# Patient Record
Sex: FEMALE | Race: BLACK OR AFRICAN AMERICAN | NOT HISPANIC OR LATINO | Employment: OTHER | URBAN - METROPOLITAN AREA
[De-identification: names, ages, dates, MRNs, and addresses within clinical notes are randomized per-mention and may not be internally consistent; named-entity substitution may affect disease eponyms.]

---

## 2021-06-03 ENCOUNTER — RECORDS - HEALTHEAST (OUTPATIENT)
Dept: ADMINISTRATIVE | Facility: CLINIC | Age: 31
End: 2021-06-03

## 2022-02-01 ENCOUNTER — TELEPHONE (OUTPATIENT)
Dept: FAMILY MEDICINE | Facility: CLINIC | Age: 32
End: 2022-02-01
Payer: MEDICARE

## 2022-02-03 ENCOUNTER — TELEPHONE (OUTPATIENT)
Dept: FAMILY MEDICINE | Facility: CLINIC | Age: 32
End: 2022-02-03

## 2022-02-03 NOTE — TELEPHONE ENCOUNTER
02/3/2022: Care Coordination    Ms. Riojas called reporting that her child was sent to [facility] from school after having a asthma attack. Ms. Riojas requested that I cancel the ride scheduled to our office and schedule one to the hospital to care for her child.    I called [employer] [phone] to reschedule. The cab was ready to pick her up within 3 minutes. She will need to use the will call service for their return trip.      Daniel Coyne  Care Coordination   [address] [address]  [email or web address]   Office: [phone] Direct:[phone]  [facility]

## 2022-02-09 NOTE — TELEPHONE ENCOUNTER
2/9/22:   Patient called requesting ride to apt tomorrow at 1:10pm. Apt was for last Thurs 2/3, rescheduled for tomorrow Thurs 2/9 at 1:10pm. Has Medicaid. Called [employer] ([phone])    Ride will be with [employer] ([phone]), pickup time 12:10-12:40pm.     Called patient to let her know. Clarified that she is using electric wheelchair, and needs a van. Called [employer] to query about this, they are not able to provide a handicapped accessible van due to lack of contract with accessible transport companies. They expect clinics to arrange transportation.   Called [employer] and arranged van pickup.     Ride will be with [employer] ([phone]) , pickup time 12:10-12:40pm.     ADDENDUM 2/10/2022 1:42 PM:  Patient had not been picked up by [employer] at 1:00pm, and apt was at 1:10pm. She had called [employer] many times and they kept saying all the handicapped accessible vans were in use, and that she should be patient and wait for a ride. Expressed frustration regarding this.     Would like to reschedule and can try to get regular cab ride, and have her PCA assist her in getting from her motor wheelchair into the car. Can use manual wheelchairs from clinic once she arrives.     Rescheduled for Thurs 2/17/22 at 2:50pm with Dr. Yates. Set up ride through [facility]. Ride will be with [employer] ([phone]) with pickup around 1:50pm.     Called patient back to inform.     NALDO Huitron

## 2022-02-17 ENCOUNTER — OFFICE VISIT (OUTPATIENT)
Dept: FAMILY MEDICINE | Facility: CLINIC | Age: 32
End: 2022-02-17
Payer: MEDICARE

## 2022-02-17 VITALS
HEART RATE: 75 BPM | TEMPERATURE: 98.8 F | OXYGEN SATURATION: 100 % | DIASTOLIC BLOOD PRESSURE: 94 MMHG | RESPIRATION RATE: 16 BRPM | SYSTOLIC BLOOD PRESSURE: 154 MMHG

## 2022-02-17 DIAGNOSIS — I10 ESSENTIAL HYPERTENSION: ICD-10-CM

## 2022-02-17 DIAGNOSIS — Z12.4 CERVICAL CANCER SCREENING: ICD-10-CM

## 2022-02-17 DIAGNOSIS — Z11.4 SCREENING FOR HIV (HUMAN IMMUNODEFICIENCY VIRUS): ICD-10-CM

## 2022-02-17 DIAGNOSIS — Z11.59 NEED FOR HEPATITIS C SCREENING TEST: ICD-10-CM

## 2022-02-17 DIAGNOSIS — R87.613 HSIL (HIGH GRADE SQUAMOUS INTRAEPITHELIAL LESION) ON PAP SMEAR OF CERVIX: ICD-10-CM

## 2022-02-17 DIAGNOSIS — N31.9 NEUROGENIC BLADDER: ICD-10-CM

## 2022-02-17 DIAGNOSIS — J30.1 ALLERGIC RHINITIS DUE TO POLLEN, UNSPECIFIED SEASONALITY: ICD-10-CM

## 2022-02-17 DIAGNOSIS — J45.20 MILD INTERMITTENT ASTHMA WITHOUT COMPLICATION: Primary | ICD-10-CM

## 2022-02-17 DIAGNOSIS — F33.9 DEPRESSION, RECURRENT (H): ICD-10-CM

## 2022-02-17 DIAGNOSIS — G82.20 LOWER PARAPLEGIA (H): ICD-10-CM

## 2022-02-17 DIAGNOSIS — D50.9 MICROCYTIC ANEMIA: ICD-10-CM

## 2022-02-17 LAB
ERYTHROCYTE [DISTWIDTH] IN BLOOD BY AUTOMATED COUNT: 14.3 % (ref 10–15)
FERRITIN SERPL-MCNC: 53 NG/ML (ref 10–130)
HCT VFR BLD AUTO: 32.5 % (ref 35–47)
HGB BLD-MCNC: 10.6 G/DL (ref 11.7–15.7)
HIV 1+2 AB+HIV1 P24 AG SERPL QL IA: NEGATIVE
MCH RBC QN AUTO: 29.8 PG (ref 26.5–33)
MCHC RBC AUTO-ENTMCNC: 32.6 G/DL (ref 31.5–36.5)
MCV RBC AUTO: 91 FL (ref 78–100)
PLATELET # BLD AUTO: 292 10E3/UL (ref 150–450)
RBC # BLD AUTO: 3.56 10E6/UL (ref 3.8–5.2)
WBC # BLD AUTO: 6 10E3/UL (ref 4–11)

## 2022-02-17 PROCEDURE — 82728 ASSAY OF FERRITIN: CPT | Performed by: FAMILY MEDICINE

## 2022-02-17 PROCEDURE — 86803 HEPATITIS C AB TEST: CPT | Performed by: FAMILY MEDICINE

## 2022-02-17 PROCEDURE — 87389 HIV-1 AG W/HIV-1&-2 AB AG IA: CPT | Performed by: FAMILY MEDICINE

## 2022-02-17 PROCEDURE — 99204 OFFICE O/P NEW MOD 45 MIN: CPT | Performed by: FAMILY MEDICINE

## 2022-02-17 PROCEDURE — 36415 COLL VENOUS BLD VENIPUNCTURE: CPT | Performed by: FAMILY MEDICINE

## 2022-02-17 PROCEDURE — 85027 COMPLETE CBC AUTOMATED: CPT | Performed by: FAMILY MEDICINE

## 2022-02-18 LAB — HCV AB SERPL QL IA: NONREACTIVE

## 2022-02-19 PROBLEM — G60.8 PERIPHERAL SENSORY NEUROPATHY: Status: ACTIVE | Noted: 2021-03-30

## 2022-02-19 PROBLEM — F33.9 DEPRESSION, RECURRENT (H): Status: ACTIVE | Noted: 2021-03-30

## 2022-02-19 PROBLEM — O14.90 PREECLAMPSIA: Status: ACTIVE | Noted: 2021-05-18

## 2022-02-19 PROBLEM — F12.90 MARIJUANA USE: Status: ACTIVE | Noted: 2021-03-30

## 2022-02-19 PROBLEM — Z87.440 HISTORY OF RECURRENT UTIS: Status: ACTIVE | Noted: 2021-03-30

## 2022-02-19 PROBLEM — R87.613 HSIL (HIGH GRADE SQUAMOUS INTRAEPITHELIAL LESION) ON PAP SMEAR OF CERVIX: Status: ACTIVE | Noted: 2021-03-30

## 2022-02-19 RX ORDER — DULOXETIN HYDROCHLORIDE 60 MG/1
60 CAPSULE, DELAYED RELEASE ORAL DAILY
COMMUNITY
Start: 2021-10-25 | End: 2022-02-19

## 2022-02-19 RX ORDER — LORATADINE 10 MG/1
10 TABLET ORAL DAILY PRN
Qty: 30 TABLET | Refills: 4 | Status: SHIPPED | OUTPATIENT
Start: 2022-02-19 | End: 2023-12-04

## 2022-02-19 RX ORDER — DULOXETIN HYDROCHLORIDE 60 MG/1
60 CAPSULE, DELAYED RELEASE ORAL DAILY
Qty: 30 CAPSULE | Refills: 11 | Status: SHIPPED | OUTPATIENT
Start: 2022-02-19 | End: 2023-12-06

## 2022-02-19 RX ORDER — PANTOPRAZOLE SODIUM 20 MG/1
20 TABLET, DELAYED RELEASE ORAL DAILY
COMMUNITY
Start: 2021-10-25 | End: 2023-12-06

## 2022-02-19 RX ORDER — CYCLOBENZAPRINE HCL 10 MG
10 TABLET ORAL 3 TIMES DAILY PRN
COMMUNITY
Start: 2021-10-25 | End: 2022-03-16

## 2022-02-19 RX ORDER — AMLODIPINE BESYLATE 5 MG/1
5 TABLET ORAL DAILY
COMMUNITY
Start: 2021-10-25 | End: 2022-02-19

## 2022-02-19 RX ORDER — AMLODIPINE BESYLATE 5 MG/1
5 TABLET ORAL DAILY
Qty: 90 TABLET | Refills: 3 | Status: SHIPPED | OUTPATIENT
Start: 2022-02-19 | End: 2023-12-06
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
## 2022-02-19 NOTE — PATIENT INSTRUCTIONS
The main three health concerns we discussed and plan to address today are your Blood Pressure, your mood and your history of abnormal PAP of your cervix.  1) Amlodipine prescriptions sent to your pharmacy.  and start. Use a pill box to help remember to take daily. You will take this medication long term to prevent things like strokes and heart attacks in 10-20 years.  2) Duloxetine prescription sent as well. This is to help mood. Take daily. OK to take same time as amlodipine. Recheck in one month. If duloxetine helpful, will continue for one year. Will consider tapering off the medication each year, depending on what's going on in your life.  3) Return for repeat PAP when not on period. (4weeks)

## 2022-03-16 ENCOUNTER — MEDICAL CORRESPONDENCE (OUTPATIENT)
Dept: HEALTH INFORMATION MANAGEMENT | Facility: CLINIC | Age: 32
End: 2022-03-16

## 2022-03-16 ENCOUNTER — OFFICE VISIT (OUTPATIENT)
Dept: FAMILY MEDICINE | Facility: CLINIC | Age: 32
End: 2022-03-16
Payer: MEDICARE

## 2022-03-16 VITALS
SYSTOLIC BLOOD PRESSURE: 143 MMHG | OXYGEN SATURATION: 100 % | DIASTOLIC BLOOD PRESSURE: 84 MMHG | TEMPERATURE: 98.1 F | HEART RATE: 71 BPM | RESPIRATION RATE: 16 BRPM

## 2022-03-16 DIAGNOSIS — B96.89 BACTERIAL VAGINOSIS: ICD-10-CM

## 2022-03-16 DIAGNOSIS — N76.0 BACTERIAL VAGINOSIS: ICD-10-CM

## 2022-03-16 DIAGNOSIS — G82.20 LOWER PARAPLEGIA (H): ICD-10-CM

## 2022-03-16 DIAGNOSIS — Z12.4 CERVICAL CANCER SCREENING: ICD-10-CM

## 2022-03-16 DIAGNOSIS — R87.613 HSIL (HIGH GRADE SQUAMOUS INTRAEPITHELIAL LESION) ON PAP SMEAR OF CERVIX: ICD-10-CM

## 2022-03-16 DIAGNOSIS — N89.8 VAGINAL DISCHARGE: ICD-10-CM

## 2022-03-16 DIAGNOSIS — N30.00 ACUTE CYSTITIS WITHOUT HEMATURIA: ICD-10-CM

## 2022-03-16 DIAGNOSIS — L89.159 PRESSURE INJURY OF SKIN OF SACRAL REGION, UNSPECIFIED INJURY STAGE: ICD-10-CM

## 2022-03-16 DIAGNOSIS — Z11.51 SCREENING FOR HUMAN PAPILLOMAVIRUS: Primary | ICD-10-CM

## 2022-03-16 DIAGNOSIS — R32 URINARY INCONTINENCE, UNSPECIFIED TYPE: ICD-10-CM

## 2022-03-16 DIAGNOSIS — I10 ESSENTIAL HYPERTENSION: ICD-10-CM

## 2022-03-16 LAB
CLUE CELLS: PRESENT
TRICHOMONAS, WET PREP: ABNORMAL
WBC'S/HIGH POWER FIELD, WET PREP: ABNORMAL
YEAST, WET PREP: ABNORMAL

## 2022-03-16 PROCEDURE — 99215 OFFICE O/P EST HI 40 MIN: CPT | Mod: GC | Performed by: STUDENT IN AN ORGANIZED HEALTH CARE EDUCATION/TRAINING PROGRAM

## 2022-03-16 PROCEDURE — 87591 N.GONORRHOEAE DNA AMP PROB: CPT | Performed by: STUDENT IN AN ORGANIZED HEALTH CARE EDUCATION/TRAINING PROGRAM

## 2022-03-16 PROCEDURE — 87624 HPV HI-RISK TYP POOLED RSLT: CPT | Performed by: STUDENT IN AN ORGANIZED HEALTH CARE EDUCATION/TRAINING PROGRAM

## 2022-03-16 PROCEDURE — G0123 SCREEN CERV/VAG THIN LAYER: HCPCS | Performed by: STUDENT IN AN ORGANIZED HEALTH CARE EDUCATION/TRAINING PROGRAM

## 2022-03-16 PROCEDURE — 87210 SMEAR WET MOUNT SALINE/INK: CPT | Performed by: STUDENT IN AN ORGANIZED HEALTH CARE EDUCATION/TRAINING PROGRAM

## 2022-03-16 RX ORDER — NITROFURANTOIN 25; 75 MG/1; MG/1
100 CAPSULE ORAL 2 TIMES DAILY
Qty: 10 CAPSULE | Refills: 0 | Status: SHIPPED | OUTPATIENT
Start: 2022-03-16 | End: 2022-03-21

## 2022-03-16 RX ORDER — AMLODIPINE BESYLATE 5 MG/1
5 TABLET ORAL DAILY
Qty: 90 TABLET | Refills: 0 | Status: SHIPPED | OUTPATIENT
Start: 2022-03-16 | End: 2022-06-21

## 2022-03-16 RX ORDER — METRONIDAZOLE 500 MG/1
500 TABLET ORAL 2 TIMES DAILY
Qty: 14 TABLET | Refills: 0 | Status: SHIPPED | OUTPATIENT
Start: 2022-03-16 | End: 2022-03-23

## 2022-03-16 RX ORDER — CYCLOBENZAPRINE HCL 10 MG
10 TABLET ORAL 3 TIMES DAILY PRN
Qty: 120 TABLET | Refills: 3 | Status: SHIPPED | OUTPATIENT
Start: 2022-03-16 | End: 2023-12-04

## 2022-03-16 NOTE — Clinical Note
Please review orders and note as there are a few things interfering with note closure.  Best wishes,  John Tee MD

## 2022-03-16 NOTE — PROGRESS NOTES
Preceptor Attestation:    I discussed the patient with the resident and evaluated the patient in person. I have verified the content of the note, which accurately reflects my assessment of the patient and the plan of care.   Supervising Physician:  John Tee MD.

## 2022-03-16 NOTE — PROGRESS NOTES
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 seating system to rise up 7 , this will allow her greater success with lateral transfers, completion of self care tasks, provide care for her children, and participation in work related requirements. Carol can adjust the height of the power wheelchair seat to help create a  downhill  transfer toward her desired surface (bed, wheelchair, commode). This power feature will allow her increased safety and independence with completing lateral transfers. She is home alone for 9 hours per day, it is important that she be as independent as possible with all her self care and positioning needs. Due to her spinal cord injury, her lower extremities are paralyzed. This power seat elevator will allow Carol to reach items in her bathroom to complete grooming task, in the kitchen for simple meal prep, to assist her children with their cares and homework. At work, Carol would be able to access counters, cupboards and smart boards to lead meetings, meet with customers and colleagues, help direct customers to where they need to go, and help safely navigate the crowded hallways during evens. She can raise up the height of the wheelchair to engage in age  appropriate eye to eye discussions with medical professionals, teachers, and coworkers. She can use the seat elevator to raise up higher than the work surface, so she can look down on the project and provide supervision and direction to children, to assist with completion of the project. This power feature will allow Carol to be more independent with her own self cares and simple meal prep, and allow her to complete her necessary job duties, assist her children with their school projects. This power wheelchair feature is essential for Carol s independence with transfers, self cares, psychosocial needs, and functional mobility. The power seat elevator feature is a necessary component to allow the power standing feature to engage.  5. StandUp Power Stand Module is important for several reasons. This standing feature will allow for true weight shifting and pressure relief, as Carol can put her weight through her legs/feet. Carol stated she feels much better when in the standing position - the pain eases in her legs, she feels like she can breathe easier. During the trial of this standing power wheelchair, Carol demonstrated safe use of the wheelchair in standing and driving mode. She was able to safely navigate within the therapy area, going up/down the ramp/incline also the sidewalks and curb cut outs similar to a community setting. This power standing wheelchair allows for independent weight bearing which is essential to reducing osteoporosis, and will help to reduce the risk of contractures. The standing feature transfers pressure away from the bony parts of her back and buttocks, to help reduce the risk of skin breakdown. After completing the trial with the standing power wheelchair, Carol stated she could stand up as needed to alleviate pain, regardless of where she is. The standing feature helps with digestion, respiration and bowel and bladder management. A study done in 1948 on the effects of  immobility on otherwise healthy individuals had some remarkable findings. After only 6 weeks of bedrest, the participants had a significant decrease in their bone density, increased risk of pressure ulcers, increased development of joint contractures, impaired bowel and bladder functioning, impaired respiratory functioning, and gastro-intestinal problems. Carol has stated that she feels she can breathe better and has decreased spasticity when she is standing. When going from sitting to standing position, she can stop as needed to help reduce the risk of orthostatic hypotension, and also provide spasticity management. During the standing wheelchair trial, she said that the knee blocks helped to manage her spasticity, and that she felt comfortable with the positioning in the standing wheelchair. Studies have shown the positive effects of standing on the bone density of the spinal cord population. This is especially important to help protect the integrity of her bones, help prevent osteoporosis, and decrease the risk of fractures. The standing position of the wheelchair will allow Carol to be eye to eye with her peers, coworkers and friends/family; something that she has not been able to attain since the day of her spinal cord injury in 2011. This will allow her to interact with her peers on an equal basis. Most importantly, since Carol is home alone for 9 hours per day, the standing feature improves access to sinks (for hygiene and self cares), counters (to access self care items), cabinets and refrigerator (for meal prep items), microwave (to heat up/make meals) and closets (for clothing items) when standing and driving. These are all essential for Carol s independence. In the community, she will be able to buy movie tickets from the high counters, or retrieve paper towels from a dispenser that is too high from a seated wheelchair position. Carol would be able to interact appropriately with professionals,  whether it is store employees or her medical practitioners. 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 Carol would be able to stand and look down on the work projects and provide supervision and direction to children to assist with completion of their homework tasks. Overall, the standing feature of this power wheelchair is essential to her physical, emotional, and psychosocial.  6. Matrx Ernesto head rest is necessary with any tilt/recline feature, as it is essential that Carol has a place to rest her head when tilted/reclined back. Tilting or reclining without head support can cause chronic neck pain and injury. It is also a safety feature that is recommended when Carol is using medical or public transportation. It will help prevent head and neck injuries in case the vehicle in which she is riding is rear ended.  7. Adjustable removable hardware is recommended for use with a headrest. The adjustable removable hardware is essential for optimal positioning of the headrest, and it can be easily removed as needed.  8. Matrx Elite back rest is recommended to properly support Carol s back. Carol has weakened trunk muscles due to spinal cord injury. She needs a back rest that will provide the support her trunk needs, for positioning and pain relief. This style of back rest will help her maintain trunk midline, thus allowing for a more functional upper extremity reach. During the trial, Carol found this back rest to be comfortable.  9. Demar Fusion cushion is necessary as Carol is highly susceptible to decubiti. Carol has weakened pelvic muscles and decreased sensation in her buttocks. She has a history of buttock skin breakdown, that healed skin will be fragile for the rest of her life. Carol is at high risk for skin breakdown. This cushion will provide proper pelvic  positioning and help protect skin integrity. This is a replacement of Carol hoover current cushion, as she has not had any skin issues on this cushion in the past. Carol found this cushion to be comfortable during the home trial.  10. Thigh guides, 2 pair on each side, are important to provide good positioning for Carol hoover legs. Because of her spinal cord injury and resulting paraplegia, Carol has no leg function or strength. We are requesting 2 sets of thigh guides, one that will be placed up by her hip and one down closer to her knee. This set of thigh guides will help guide and keep her legs in midline positioning, which is especially important when she going into standing position. Without these thigh guides, Carol hoover legs splay outward and cause her lower legs to rest on the leg rests and brackets. This extra pressure on the lateral edges of her lower legs increases her risk for skin breakdown in that area and causes extra pressure on her hips, increasing his low back pain. These thigh guides are installed on the seat rail to provide optimal support to her legs. These thigh guides are essential to good leg positioning and better distribution of her seated pressure. The correct positioning of her legs is essential when Carol uses the power standing feature. Incorrectly positioned legs could result in severe injury to her legs and instability when in the standing feature of the power wheelchair.  11. Adjustable removable hardware for thigh guides are recommended for Carol hoover power wheelchair. This hardware allows the thigh guides to be installed on the seat rail, so they can be optimally positioned. The thigh guides can be adjusted above the cushion and in close to Carol hoover legs to provide necessary support. The hardware also ensures that the guides can be easily removed for lateral transfers.  12. Flat Multi position arm pads are necessary for Carol hoover power wheelchair. These arm pads will allow for them  to be installed on the arm rest tubes as needed to support her arms.  13.  Outback  option for arm rest installation is requested to allow the above requested arm pad to be installed inside the arm rest tube, ensuring that the arm pad can be installed as close as possible to Carol s trunk while still allowing the arm rest to be flipped back and out of the way for lateral transfers. This is an important safety feature to ensure she can complete safe and independent lateral transfers.  14. Power elevating articulating leg rests are recommended to properly support Crystal s feet and lower extremities. It is important that her feet are properly supported, as that will provide a good positioning base for her lower extremities. Good leg positioning will evenly distribute her seated pressure. The power feature of these leg rests allow Carol to be independent with her positioning needs; this is essential because she is home alone for 9 hours per day. When using the power elevating legs in conjunction with power tilt, it will help to decrease the edema in her legs and feet. This center mount power foot rest system allows for a smaller overall foot print of the wheelchair, helping ensure that Carol can access all important areas of her house and work environments. Additionally, this power elevating leg rest system is required for the power standing feature.  15. 2 piece footplate, 9.5  deep x 5.5  wide, is recommended for Carol s power wheelchair. These foot plates will accommodate her feet and allow for optimal leg positioning. This is especially important for when she is using the standing feature on her power wheelchair, her feet need to be optimally placed and positioned to ensure good weight bearing and reduce the risk of injury.  16. Motion height adjustable swing away quad-link joystick mount is recommended as it allows Carol to get the joystick out of the way for transfers and get closer to tables for  functional tasks such as brushing teeth, eating, and kitchen activities. This hardware mount allows for both vertical height and horizontal distance adjustment. Standard joystick mounting hardware only allows for horizontal adjustment (forward or back pearce). This mounting hardware allows for vertical height adjustment of the joystick knob, so it can be placed where it is comfortable for Carol hoover reach, wrist placement, etc. This joystick hardware will allow for the optimal placement of the joystick, for safe and independent operation of the power wheelchair.  17. 8-Way Rocker switch is requested for Carol hoover power wheelchair. This switch box provides up/down button functions for each of the power positioning features (tilt, recline, elevate, stand), installed separately from the joystick. This allows Carol the ability to quickly and efficiently access whichever power function she needs without having to toggle through the joystick and then have to toggle back to the drive function. It allows for greater safety and independence with operating and controlling all the power functions on her power wheelchair.  18. Expandable Controller is necessary to operate the wheelchair s power features. It will interface between the wheelchair electronics, the drive controls and membrane switch box.  19. Harness for expandable electronics is required for the operation of this power wheelchair. The harness includes the wires, fuse boxes, fuses, circuits, switches and all necessary fasteners, connectors, and mounting hardware required for the operation of the expandable controller.  20. Multiple Function through drive control is required due to the number of power features that are recommended for this wheelchair. This kit will allow all the different power functions to work independent of and in conjunction with each other. This item is vital to ensuring Carol hoover independence with his/her positioning and mobility needs.  21.  Wheelchair transport tie downs are important for this power wheelchair. They provide an essential safety feature as Carol uses medical or public transportation for all her appointments. Her wheelchair needs to be tied down whenever she is being transported.  22. Group 24 batteries are recommended so Carol can safely operate all the power systems and won t run out of power during the day. 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 She enjoys spending time with her 4 children, ages 16 to . She is in her power wheelchair for 11 hours per day. She is home alone for 9 hours per day. Carol needs to know that her power wheelchair will be reliable and have the power needed to last for her long active days. Without batteries, the wheelchair is non operational.    All other wheelchair features are standard or no-charge options to this chair.    If you have further questions regarding this request, you may call [phone]. Thank you for your prompt consideration of this wheelchair request to meet Carol s needs.    Physician or Non-Physician Provider signature indicates agreement with above plan.    Leonor Powell NP Date Time  Nurse Practitioner    Marianna Chaney MA, OTR/L, ATP Date Time  Rehabilitation Equipment Services

## 2022-03-17 LAB — N GONORRHOEA DNA SPEC QL NAA+PROBE: NEGATIVE

## 2022-03-18 LAB
HUMAN PAPILLOMA VIRUS 16 DNA: NEGATIVE
HUMAN PAPILLOMA VIRUS 18 DNA: NEGATIVE
HUMAN PAPILLOMA VIRUS FINAL DIAGNOSIS: NORMAL
HUMAN PAPILLOMA VIRUS OTHER HR: NEGATIVE

## 2022-03-22 LAB
BKR LAB AP GYN ADEQUACY: NORMAL
BKR LAB AP GYN INTERPRETATION: NORMAL
BKR LAB AP GYN OTHER FINDINGS: NORMAL
BKR LAB AP HPV REFLEX: NORMAL
BKR LAB AP PREVIOUS ABNL DX: NORMAL
BKR LAB AP PREVIOUS ABNORMAL: NORMAL
PATH REPORT.COMMENTS IMP SPEC: NORMAL
PATH REPORT.COMMENTS IMP SPEC: NORMAL
PATH REPORT.RELEVANT HX SPEC: NORMAL

## 2022-03-22 NOTE — PATIENT INSTRUCTIONS
22   UROLOGY ADULT REFERRAL   [facility]  [phone] or [phone] (Physician hotline: [phone])  Fax: [phone]  Fax: [phone] (try this one if above fax fails)    Referral, demographics, office notes and labs faxed to [phone].     Mabel Bridges

## 2022-03-24 PROBLEM — R87.619 ABNORMAL PAP SMEAR OF CERVIX: Status: ACTIVE | Noted: 2022-03-16

## 2022-03-27 ENCOUNTER — HEALTH MAINTENANCE LETTER (OUTPATIENT)
Age: 32
End: 2022-03-27

## 2022-03-30 ENCOUNTER — MEDICAL CORRESPONDENCE (OUTPATIENT)
Dept: HEALTH INFORMATION MANAGEMENT | Facility: CLINIC | Age: 32
End: 2022-03-30

## 2022-04-05 ENCOUNTER — VIRTUAL VISIT (OUTPATIENT)
Dept: FAMILY MEDICINE | Facility: CLINIC | Age: 32
End: 2022-04-05
Payer: MEDICARE

## 2022-04-05 DIAGNOSIS — L89.159 PRESSURE INJURY OF SKIN OF SACRAL REGION, UNSPECIFIED INJURY STAGE: ICD-10-CM

## 2022-04-05 DIAGNOSIS — J30.1 ALLERGIC RHINITIS DUE TO POLLEN, UNSPECIFIED SEASONALITY: ICD-10-CM

## 2022-04-05 DIAGNOSIS — R32 URINARY INCONTINENCE, UNSPECIFIED TYPE: ICD-10-CM

## 2022-04-05 DIAGNOSIS — N31.9 NEUROGENIC BLADDER: ICD-10-CM

## 2022-04-05 DIAGNOSIS — I10 ESSENTIAL HYPERTENSION: Primary | ICD-10-CM

## 2022-04-05 DIAGNOSIS — N30.00 ACUTE CYSTITIS WITHOUT HEMATURIA: ICD-10-CM

## 2022-04-05 DIAGNOSIS — G82.20 LOWER PARAPLEGIA (H): ICD-10-CM

## 2022-04-05 PROCEDURE — 99443 PR PHYSICIAN TELEPHONE EVALUATION 21-30 MIN: CPT | Mod: GC | Performed by: STUDENT IN AN ORGANIZED HEALTH CARE EDUCATION/TRAINING PROGRAM

## 2022-04-05 RX ORDER — CETIRIZINE HYDROCHLORIDE 10 MG/1
10 TABLET ORAL DAILY
Qty: 90 TABLET | Refills: 3 | Status: SHIPPED | OUTPATIENT
Start: 2022-04-05 | End: 2023-12-06

## 2022-04-05 RX ORDER — FLUTICASONE PROPIONATE 50 MCG
2 SPRAY, SUSPENSION (ML) NASAL 2 TIMES DAILY PRN
Qty: 16 G | Refills: 3 | Status: SHIPPED | OUTPATIENT
Start: 2022-04-05 | End: 2023-12-04

## 2022-04-05 ASSESSMENT — ANXIETY QUESTIONNAIRES
1. FEELING NERVOUS, ANXIOUS, OR ON EDGE: NOT AT ALL
5. BEING SO RESTLESS THAT IT IS HARD TO SIT STILL: NOT AT ALL
2. NOT BEING ABLE TO STOP OR CONTROL WORRYING: NOT AT ALL
IF YOU CHECKED OFF ANY PROBLEMS ON THIS QUESTIONNAIRE, HOW DIFFICULT HAVE THESE PROBLEMS MADE IT FOR YOU TO DO YOUR WORK, TAKE CARE OF THINGS AT HOME, OR GET ALONG WITH OTHER PEOPLE: NOT DIFFICULT AT ALL
6. BECOMING EASILY ANNOYED OR IRRITABLE: NOT AT ALL
3. WORRYING TOO MUCH ABOUT DIFFERENT THINGS: NOT AT ALL
7. FEELING AFRAID AS IF SOMETHING AWFUL MIGHT HAPPEN: NOT AT ALL
GAD7 TOTAL SCORE: 0

## 2022-04-05 ASSESSMENT — PATIENT HEALTH QUESTIONNAIRE - PHQ9
SUM OF ALL RESPONSES TO PHQ QUESTIONS 1-9: 0
5. POOR APPETITE OR OVEREATING: NOT AT ALL

## 2022-04-05 ASSESSMENT — ASTHMA QUESTIONNAIRES: ACT_TOTALSCORE: 24

## 2022-04-05 NOTE — PROGRESS NOTES
Preceptor Attestation:   I discussed the patient with the resident. I talked to the patient on the phone. I have verified the content of the note, which accurately reflects my assessment of the patient and the plan of care.   Supervising Physician:  John Tee MD.

## 2022-04-05 NOTE — PROGRESS NOTES
Carol is a 31 year old who is being evaluated via a billable telephone visit.      What phone number would you like to be contacted at? [phone]  How would you like to obtain your AVS? Mail a copy    Assessment & Plan       Allergic rhinitis due to pollen, unspecified seasonality  - Cetirizine and flonase    Neurogenic bladder  Acute cystitis without hematuria  Symptoms improved but now might be getting worse.  She needs supplies due to neurogenic bladder, paraplegia, recurrent UTIs, risk of systemic infection.  Carol will call [facility].  Route to RN to clarify supply needs so that I can reorder or sign necessary forms for [employer].  Needs female urinal.  - Female urinal    Lower paraplegia (H)  Has an upcoming appt 4/13.  Needs help with ride.  Route to SW.     Pressure injury of skin of sacral region, unspecified injury stage  Has not been able to get a visit scheduled.  Will route to SW and RN to assist.     Urinary incontinence, unspecified type  Needs supplies.  [facility] reportedly faxed a form to PCP, but I have sent anything I've received back.  I wonder if orders were sent to previous PCP.  Will route to RN to clarify orders with Carol and we might need to reorder via [employer] smart set.  Carol will send a [employer] message with questions and once she learns more from [facility].     Essential hypertension  Unable to get cuff.  Will ask [facility].    - Take amlodipine at night      Needs:  - catheter supplies  - female urinal  - blood pressure cuff (has paper rx)  - wound care  - ride 4/13       Staffed with Dr. Tee.      Rani Mac MD  [facility]    Subjective     Chief Complaint   Patient presents with     other     Having bladder infection. Still hasnt received her replies     other     taking BP medication makes her feel fatigue          HPI     Feels drained and lightheaded:  Takes amlodipine in AM after eating breakfast.  Didn't know if it wsa due to antibiotics.  Doing well today.   Wondering if it was blood pressure medicine or due to antibiotics.      Cystitis:  Tried to get more medical supplies from [facility]. They said they were waiting to get forms from provider which I have filled out and had faxed back.      Prescription for BP cuff:  Didn't have in stock.  The other is close to [place].  Has not been taking blood pressure because she hasn't had cuff    Wheelchair problems:  [facility].  Needs help setting up ride for this.  Has been having issues with other ride service.      Physical therapy:  Would like to start getting physical therapy.     Urology:  They called patient.  They are working on finding a time to get pt on schedule in April.     Wound care:  Has not heard back.  Tried calling a couple of times.     Asthma  ACT Total Scores 4/5/2022   ACT TOTAL SCORE (Goal Greater than or Equal to 20) 24   In the past 12 months, how many times did you visit the emergency room for your asthma without being admitted to the hospital? 0   In the past 12 months, how many times were you hospitalized overnight because of your asthma? 0     BILLIE  BILLIE-7 SCORE 4/5/2022   Total Score 0     Depression Screening  PHQ 4/5/2022   PHQ-9 Total Score 0   Q9: Thoughts of better off dead/self-harm past 2 weeks Not at all             Review of Systems   Constitutional, HEENT, cardiovascular, pulmonary, gi and gu systems are negative, except as otherwise noted.      Objective           Vitals:  No vitals were obtained today due to virtual visit.    Physical Exam   healthy, alert and no distress  PSYCH: Alert and oriented times 3; coherent speech, normal   rate and volume, able to articulate logical thoughts, able   to abstract reason, no tangential thoughts, no hallucinations   or delusions  Her affect is normal and pleasant  RESP: No cough, no audible wheezing, able to talk in full sentences  Remainder of exam unable to be completed due to telephone visits    Office Visit on 03/16/2022    Component Date Value Ref Range Status     Trichomonas 03/16/2022 Absent  Absent Final     Yeast 03/16/2022 Absent  Absent Final     Clue Cells 03/16/2022 Present (A) Absent Final     WBCs/high power field 03/16/2022 2+ (A) None Final     Interpretation 03/16/2022 Negative for Intraepithelial Lesion or Malignancy (NILM)    Final     Other Findings 03/16/2022 Shift in vaginal alen suggestive of bacterial vaginosis  Bacteria morphologically consistent with Actinomyces spp  Endometrial cells in a woman >=45 years of age; endometrial cells correlate with menstrual history provided, Trichomonas vaginalis, Fungal organisms morphologically consistent with Candida spp, Shift in vaginal alen suggestive of bacterial vaginosis,... Final     Comment 03/16/2022    Final                    Value:This result contains rich text formatting which cannot be displayed here.     Specimen Adequacy 03/16/2022 Satisfactory for evaluation, endocervical/transformation zone component present   Final     Clinical Information 03/16/2022    Final                    Value:This result contains rich text formatting which cannot be displayed here.     Reflex Testing 03/16/2022 Yes regardless of result   Final     Previous Abnormal? 03/16/2022    Final                    Value:This result contains rich text formatting which cannot be displayed here.     Previous Abnormal Diagnosis 03/16/2022    Final                    Value:This result contains rich text formatting which cannot be displayed here.     Performing Labs 03/16/2022    Final                    Value:This result contains rich text formatting which cannot be displayed here.     Neisseria gonorrhoeae 03/16/2022 Negative  Negative Final    Negative for N. gonorrhoeae rRNA by transcription mediated amplification. A negative result by transcription mediated amplification does not preclude the presence of C. trachomatis infection because results are dependent on proper and adequate  collection, absence of inhibitors and sufficient rRNA to be detected.     Other HR HPV 03/16/2022 Negative  Negative Final     HPV16 DNA 03/16/2022 Negative  Negative Final     HPV18 DNA 03/16/2022 Negative  Negative Final     FINAL DIAGNOSIS 03/16/2022    Final                    Value:This result contains rich text formatting which cannot be displayed here.       ----- Service Performed and Documented by Resident or Fellow ------        Phone call duration: 24 minutes

## 2022-04-06 ENCOUNTER — TELEPHONE (OUTPATIENT)
Dept: FAMILY MEDICINE | Facility: CLINIC | Age: 32
End: 2022-04-06
Payer: MEDICARE

## 2022-04-06 ASSESSMENT — ANXIETY QUESTIONNAIRES: GAD7 TOTAL SCORE: 0

## 2022-04-06 NOTE — TELEPHONE ENCOUNTER
Patient receives   [facility]  Catheter 14Fr   120/mo current script good until 2/8/23  Drainable  ostomy pouch 2.5 inch opening 1piece cut to fit  uses 20/mo script good until 11/1/22    Message left for pt to return call regarding any additional supplies she needs    btrn

## 2022-04-06 NOTE — TELEPHONE ENCOUNTER
04/06/2022: Care Coordination    I scheduled transportation for upcoming appointments for Ms. Riojas, who is a lower extremity paraplegic and has a motorized wheel chair that requires a wheelchair ramp.     Medicare stated that they do have vans with wheelchair ramps. Based on that and Ms. Riojas missing appointments in the past due to transportation, I have schedule transportation through our account with [employer].    Note: The Transportation provider has been informed of the need a wheelchair ramp for both appointments.    Appointments    4/13/2022 at 3:00pm    [address], [address]; [place] [address].    Pick time: 2:20pm  She will need to use the will call service for her return trip by calling [phone]    4/18/2022 at 9:00am going to:  [facility]  [facility]. [facility] [address]   time 8:00am  She will need to use the will call service for the return trip by calling [phone]    I have called Ms. Riojas and sent a letter with the details of the these appointments and trips      Daniel Coyne Sr.  Care Coordinator  [address] [address]  [email or web address]   Office: [phone]  Direct: [phone]  [facility]

## 2022-04-06 NOTE — LETTER
April 6, 2022      Carol Riojas  [address] [place] [address]        Dear Carol,  I am one of the Care Coordinators at [facility]. Thank you for speaking with me today and allowing me to set transportation up for you. This is just a reminder of your appointments and transportation    4/13/2022 at 3:00pm    [facility]   [address] [place] [address], [address]; [place] [address].    Pick time: 2:20pm  you will need to use the will call service for her return trip by calling [phone]    4/18/2022 at 9:00am going to:  [facility]  [address]. [facility] [address]     time 8:00am  You will need to use the will call service for the return trip by calling [phone]          Sincerely,        Daniel Coyne Sr.  Care Coordinator  [address] [address]  [email or web address]   Office: [phone]  Direct: [phone]  [facility]

## 2022-04-06 NOTE — TELEPHONE ENCOUNTER
----- Message from Rani Mac MD sent at 4/5/2022  5:20 PM CDT -----  Dear Arelis,    This is patient who is new to our clinic but has significant needs.  She has paraplegia due to spinal cord injury and does not currently have the right supplies.  I just talked to her on the phone and she said that [facility] told her that they were waiting on a provider to return forms.  I didn't get any forms.  I wonder if maybe they were sent to her last clinic.    I am happy to reorder DME supplies, but I don't know what to order.  Could you please call Carol and/or [facility] and find out what she needs and what [facility] needs from me?      I know that she needs pieces and parts for the wheelchair, catheter supplies, BP cuff, wound care supplies (though she hasn't been able to get set up with that yet and I am not sure why), and she would like PT.  I have ordered this, but could you verify with her if she's gotten a call?  Or what type of PT I should order?    I would love to get her all tied in with us and to get some kind of list going for her DME supplies so that as one resident graduates she can keep getting the supplies she needs.       Please let me know what you find out and what I can do to help her (and you!)!    Thank you, Arelis!

## 2022-04-12 RX ORDER — ONDANSETRON 4 MG/1
TABLET, ORALLY DISINTEGRATING ORAL
COMMUNITY
Start: 2021-11-12 | End: 2023-12-04

## 2022-04-12 RX ORDER — ACETAMINOPHEN 325 MG/1
325 TABLET ORAL
COMMUNITY
End: 2024-03-27

## 2022-04-13 ENCOUNTER — HOSPITAL ENCOUNTER (OUTPATIENT)
Dept: OCCUPATIONAL THERAPY | Facility: CLINIC | Age: 32
Setting detail: THERAPIES SERIES
Discharge: HOME | End: 2022-04-13
Attending: FAMILY MEDICINE
Payer: MEDICARE

## 2022-04-13 DIAGNOSIS — L89.159 PRESSURE INJURY OF SKIN OF SACRAL REGION, UNSPECIFIED INJURY STAGE: ICD-10-CM

## 2022-04-13 DIAGNOSIS — G82.20 LOWER PARAPLEGIA (H): ICD-10-CM

## 2022-04-13 PROCEDURE — 97542 WHEELCHAIR MNGMENT TRAINING: CPT | Mod: GO | Performed by: OCCUPATIONAL THERAPIST

## 2022-04-13 NOTE — PROGRESS NOTES
04/13/22 1500   Quick Adds   Quick Adds Current Power Wheelchair   General Information (PT: include personal factors and/or comorbidities that impact the POC; OT: include additional occupational profile info)   Rehab Discipline OT   Funding MA   Service Outpatient;Occupational Therapy;Seating/Wheeled Mobility Evaluation   Start Of Care Date 04/13/22   Referring Physician 4/5/22   Orders Evaluate And Treat As Indicated;Per Therapist Evaluation   Orders Date 04/05/22   Patient/Caregiver Goals power w/c fixed   Rehabilitation Technology Supplier Candie ATP from [employer]  (Prior ATP Eliel from [employer])   Current Community Support Personal Care Attendant;Transportation Service   Patient role/Employment history Disabled   Medical History   Onset Of Illness/injury Or Date Of Surgery 4/5/22   Medical Diagnosis T11 Paraplegia   Current Power Wheelchair   Age 9/2021    ROVI A3   Cushion Gel  (Demar 3)   Back Solid Curved   Footrest Style Power Center mount   Headrest Yes   Settings Used Home;Outdoor Community Mobility;Primary Means of Transportation   Condition Fair   Positioning Features Tilt Seat;Recline Back;Power Elevating Leg Rests;Seat Elevator  (Power stand)   Power Control Right Joystick   Current Equipment Requires Repair   Rational for Equipment Changes Heavy duty full time use with significant need for repairs.   Power Wheelchair Comments Patient has had challenges with getting chair fixed and it being safe to drive.  It has been in the shop for months and she has developed pressure wounds.  Her cushion was washed and dried by her PCA and poor.  Arm rest is falling of and needs to be replaced, chest bar and strap needed to allow for standing and many other fixes needed in order to allow for safe full time use.  Rossi to now complete repair list.   Home Accessibility   Living Environment Apartment/condo   Primary Entrance Level;Exposed Ramp   All Rooms Wheelchair Accessible No   Rooms Not  Accessible Bathroom   Community ADL   Transportation Transportation Services   Ambulation   Ambulation Non Ambulatory   Transfers   Transfer Assist Independent   Transfer Method Squat Pivot/Scoot Boost   Neuromuscular   History of Pressure Sores Yes   Current Pressure Sores Yes   Pressure Sore location Sacrum   Coordination UE Intact;LE Impaired   Tone Hypotonic   Sensory Deficits Reported Impaired below injury   Education Assessment   Barriers to Learning Physical   Preferred Learning Style Listening;Demonstration   Assessment/Plan   Criteria for Skilled Interventions Met Yes, Treatment Indicated   Treatment Diagnosis impaired participaiton in MRADLs andIADLs   Therapy Frequency once   Planned Therapy Interventions Wheelchair Management/Propulsion Training   Planned Therapy Interventions Comments Patient to get paperwork to [facility] in order for them to complete repairs to chair. Apt to be done at [facility] with ATP and tech in order to determine appropraite repairs and equipment needed for safe use of chair and standing.   Risks and benefits of treatment have been explained Yes   Patient/family & other staff in agreement with plan of care Yes   Session Time   OT Wheelchair Management Minutes (96800) 45   Adult OT Eval Goals   OT Eval Goals (Adult) 1    OT Goal 1   Goal Identifier wheelchair   Goal Description Patient/family demonstrates understanding of equipment for independent mobility, including benefits/limitations;   Goal Progress met   Target Date 04/13/22   Date Met 04/13/22   Electronically signed by:  Candie YADAV/L, ATP      Occupational Therapist, Assistive   [phone]      fax: [phone]      [email or web address]  Seating Clinic- [facility], [address]  [address]  [place] [address]

## 2022-05-02 ENCOUNTER — TELEPHONE (OUTPATIENT)
Dept: FAMILY MEDICINE | Facility: CLINIC | Age: 32
End: 2022-05-02
Payer: MEDICARE

## 2022-05-02 NOTE — TELEPHONE ENCOUNTER
Call from patient expressing that she went to the ED on Saturday 4/30 and they did an x ray but did not provide other care, and she feels that they did not provide best care for her. Not at all happy with the team. Not wishing to file complaint, just needing to vent.   Offered to schedule with PCP, she is ok scheduling this on her own.   Reminded of visit 5/4 with wound care.  Follow up as needs arise.     Oruqidea Taylor, RACHEALSW

## 2022-05-05 ENCOUNTER — TELEPHONE (OUTPATIENT)
Dept: PHYSICAL THERAPY | Facility: CLINIC | Age: 32
End: 2022-05-05
Payer: MEDICARE

## 2022-05-22 ENCOUNTER — HEALTH MAINTENANCE LETTER (OUTPATIENT)
Age: 32
End: 2022-05-22

## 2022-09-25 ENCOUNTER — HEALTH MAINTENANCE LETTER (OUTPATIENT)
Age: 32
End: 2022-09-25

## 2022-09-27 ENCOUNTER — MEDICAL CORRESPONDENCE (OUTPATIENT)
Dept: HEALTH INFORMATION MANAGEMENT | Facility: CLINIC | Age: 32
End: 2022-09-27

## 2022-11-02 ENCOUNTER — MEDICAL CORRESPONDENCE (OUTPATIENT)
Dept: HEALTH INFORMATION MANAGEMENT | Facility: CLINIC | Age: 32
End: 2022-11-02

## 2022-11-05 ENCOUNTER — MEDICAL CORRESPONDENCE (OUTPATIENT)
Dept: HEALTH INFORMATION MANAGEMENT | Facility: CLINIC | Age: 32
End: 2022-11-05

## 2022-12-08 ENCOUNTER — TELEPHONE (OUTPATIENT)
Dept: FAMILY MEDICINE | Facility: CLINIC | Age: 32
End: 2022-12-08

## 2022-12-08 NOTE — TELEPHONE ENCOUNTER
12/08/2022: Care Coordination     CC: Mabel Bridges called asking that I arrange transportation for the patient who has Medicaid and is in a Wheelchair.     CC: called [employer] but was told that they do not schedule rides for for patients in wheelchairs and was directed to a website that provides a list of providers that are Certified by Medicaid. The patient was notified via phone and her new address was update in epic. She will need to activate the will call service when ready to return home by calling: [employer] at: [phone]    CC: arranged transportation through [employer].    Appointment Date:    12/13/2022 @ 10:00am    :    9:10am

## 2022-12-21 ENCOUNTER — TELEPHONE (OUTPATIENT)
Dept: FAMILY MEDICINE | Facility: CLINIC | Age: 32
End: 2022-12-21

## 2022-12-21 NOTE — TELEPHONE ENCOUNTER
12/21/2022: Care Coordination     CC: arranged transportation for Carol and her Son Desirae who both have appointments on: 12/28/2022 at 10:00 am & 11:00 am with Dr. Rani Summers. The rides have been scheduled through Desirae's insurance: [facility] ID: [id number] because his appointment is first. [employer] [phone]. The pick window is between: 9:00 am - 9:30 am. They will need to activate the will call service when ready to return home. Mom has been notified of the transportation details.             Daniel Coyne Sr.  Social Work  Care Coordination  [address] [address]  [email or web address]   Office: [phone]  Direct: [phone]  [facility]

## 2023-04-17 ENCOUNTER — MEDICAL CORRESPONDENCE (OUTPATIENT)
Dept: HEALTH INFORMATION MANAGEMENT | Facility: CLINIC | Age: 33
End: 2023-04-17
Payer: COMMERCIAL

## 2023-04-24 ENCOUNTER — MEDICAL CORRESPONDENCE (OUTPATIENT)
Dept: HEALTH INFORMATION MANAGEMENT | Facility: CLINIC | Age: 33
End: 2023-04-24
Payer: COMMERCIAL

## 2023-06-04 ENCOUNTER — HEALTH MAINTENANCE LETTER (OUTPATIENT)
Age: 33
End: 2023-06-04

## 2023-12-04 ENCOUNTER — MYC REFILL (OUTPATIENT)
Dept: FAMILY MEDICINE | Facility: CLINIC | Age: 33
End: 2023-12-04
Payer: COMMERCIAL

## 2023-12-04 DIAGNOSIS — J30.1 ALLERGIC RHINITIS DUE TO POLLEN, UNSPECIFIED SEASONALITY: ICD-10-CM

## 2023-12-04 DIAGNOSIS — I10 ESSENTIAL HYPERTENSION: ICD-10-CM

## 2023-12-04 DIAGNOSIS — G82.20 LOWER PARAPLEGIA (H): ICD-10-CM

## 2023-12-04 RX ORDER — CETIRIZINE HYDROCHLORIDE 10 MG/1
10 TABLET ORAL DAILY
Qty: 90 TABLET | Refills: 3 | Status: CANCELLED | OUTPATIENT
Start: 2023-12-04

## 2023-12-05 RX ORDER — FLUTICASONE PROPIONATE 50 MCG
2 SPRAY, SUSPENSION (ML) NASAL 2 TIMES DAILY PRN
Qty: 16 G | Refills: 3 | Status: SHIPPED | OUTPATIENT
Start: 2023-12-05 | End: 2024-03-27

## 2023-12-05 RX ORDER — AMLODIPINE BESYLATE 5 MG/1
5 TABLET ORAL DAILY
Qty: 90 TABLET | Refills: 3 | Status: SHIPPED | OUTPATIENT
Start: 2023-12-05 | End: 2024-03-27

## 2023-12-05 RX ORDER — CYCLOBENZAPRINE HCL 10 MG
10 TABLET ORAL 3 TIMES DAILY PRN
Qty: 120 TABLET | Refills: 0 | Status: SHIPPED | OUTPATIENT
Start: 2023-12-05 | End: 2024-03-27

## 2023-12-06 ENCOUNTER — OFFICE VISIT (OUTPATIENT)
Dept: FAMILY MEDICINE | Facility: CLINIC | Age: 33
End: 2023-12-06
Payer: COMMERCIAL

## 2023-12-06 ENCOUNTER — MEDICAL CORRESPONDENCE (OUTPATIENT)
Dept: HEALTH INFORMATION MANAGEMENT | Facility: CLINIC | Age: 33
End: 2023-12-06

## 2023-12-06 VITALS
HEART RATE: 69 BPM | SYSTOLIC BLOOD PRESSURE: 147 MMHG | DIASTOLIC BLOOD PRESSURE: 96 MMHG | TEMPERATURE: 98.3 F | OXYGEN SATURATION: 99 % | RESPIRATION RATE: 20 BRPM

## 2023-12-06 DIAGNOSIS — Z23 NEED FOR PROPHYLACTIC VACCINATION AGAINST HEPATITIS B VIRUS: ICD-10-CM

## 2023-12-06 DIAGNOSIS — N31.9 NEUROGENIC BLADDER: ICD-10-CM

## 2023-12-06 DIAGNOSIS — I10 ESSENTIAL HYPERTENSION: ICD-10-CM

## 2023-12-06 DIAGNOSIS — Z00.00 ROUTINE GENERAL MEDICAL EXAMINATION AT A HEALTH CARE FACILITY: Primary | ICD-10-CM

## 2023-12-06 DIAGNOSIS — G82.20 LOWER PARAPLEGIA (H): ICD-10-CM

## 2023-12-06 DIAGNOSIS — Z23 NEED FOR PROPHYLACTIC VACCINATION AND INOCULATION AGAINST INFLUENZA: ICD-10-CM

## 2023-12-06 DIAGNOSIS — F33.9 DEPRESSION, RECURRENT (H): ICD-10-CM

## 2023-12-06 LAB
ERYTHROCYTE [DISTWIDTH] IN BLOOD BY AUTOMATED COUNT: 13.8 % (ref 10–15)
HBA1C MFR BLD: 5.4 % (ref 0–5.6)
HCT VFR BLD AUTO: 37.7 % (ref 35–47)
HGB BLD-MCNC: 12.3 G/DL (ref 11.7–15.7)
MCH RBC QN AUTO: 29.2 PG (ref 26.5–33)
MCHC RBC AUTO-ENTMCNC: 32.6 G/DL (ref 31.5–36.5)
MCV RBC AUTO: 90 FL (ref 78–100)
PLATELET # BLD AUTO: 312 10E3/UL (ref 150–450)
RBC # BLD AUTO: 4.21 10E6/UL (ref 3.8–5.2)
WBC # BLD AUTO: 5.6 10E3/UL (ref 4–11)

## 2023-12-06 PROCEDURE — 80061 LIPID PANEL: CPT

## 2023-12-06 PROCEDURE — 80048 BASIC METABOLIC PNL TOTAL CA: CPT

## 2023-12-06 PROCEDURE — 99395 PREV VISIT EST AGE 18-39: CPT | Mod: 25

## 2023-12-06 PROCEDURE — G0008 ADMIN INFLUENZA VIRUS VAC: HCPCS

## 2023-12-06 PROCEDURE — 85027 COMPLETE CBC AUTOMATED: CPT

## 2023-12-06 PROCEDURE — 90686 IIV4 VACC NO PRSV 0.5 ML IM: CPT

## 2023-12-06 PROCEDURE — 36415 COLL VENOUS BLD VENIPUNCTURE: CPT

## 2023-12-06 PROCEDURE — 83036 HEMOGLOBIN GLYCOSYLATED A1C: CPT

## 2023-12-06 ASSESSMENT — ENCOUNTER SYMPTOMS
MYALGIAS: 1
NAUSEA: 0
HEADACHES: 0
PALPITATIONS: 0
HEMATURIA: 0
ABDOMINAL PAIN: 0
FREQUENCY: 1
COUGH: 0
ARTHRALGIAS: 1
DIARRHEA: 0
PARESTHESIAS: 1
NERVOUS/ANXIOUS: 0
CONSTIPATION: 0
CHILLS: 0
HEMATOCHEZIA: 0
BREAST MASS: 0
FEVER: 0
DIZZINESS: 0
WEAKNESS: 0
JOINT SWELLING: 0
SHORTNESS OF BREATH: 0
SORE THROAT: 0
HEARTBURN: 0
EYE PAIN: 0
DYSURIA: 0

## 2023-12-06 ASSESSMENT — ANXIETY QUESTIONNAIRES
7. FEELING AFRAID AS IF SOMETHING AWFUL MIGHT HAPPEN: NOT AT ALL
GAD7 TOTAL SCORE: 3
1. FEELING NERVOUS, ANXIOUS, OR ON EDGE: NOT AT ALL
GAD7 TOTAL SCORE: 3
IF YOU CHECKED OFF ANY PROBLEMS ON THIS QUESTIONNAIRE, HOW DIFFICULT HAVE THESE PROBLEMS MADE IT FOR YOU TO DO YOUR WORK, TAKE CARE OF THINGS AT HOME, OR GET ALONG WITH OTHER PEOPLE: SOMEWHAT DIFFICULT
3. WORRYING TOO MUCH ABOUT DIFFERENT THINGS: NOT AT ALL
6. BECOMING EASILY ANNOYED OR IRRITABLE: NOT AT ALL
2. NOT BEING ABLE TO STOP OR CONTROL WORRYING: NOT AT ALL
5. BEING SO RESTLESS THAT IT IS HARD TO SIT STILL: NOT AT ALL

## 2023-12-06 ASSESSMENT — ASTHMA QUESTIONNAIRES: ACT_TOTALSCORE: 25

## 2023-12-06 ASSESSMENT — ACTIVITIES OF DAILY LIVING (ADL)
CURRENT_FUNCTION: LAUNDRY REQUIRES ASSISTANCE
CURRENT_FUNCTION: TRANSPORTATION REQUIRES ASSISTANCE
CURRENT_FUNCTION: HOUSEWORK REQUIRES ASSISTANCE
CURRENT_FUNCTION: PREPARING MEALS REQUIRES ASSISTANCE
CURRENT_FUNCTION: BATHING REQUIRES ASSISTANCE

## 2023-12-06 ASSESSMENT — PATIENT HEALTH QUESTIONNAIRE - PHQ9
5. POOR APPETITE OR OVEREATING: NEARLY EVERY DAY
SUM OF ALL RESPONSES TO PHQ QUESTIONS 1-9: 4
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
## 2023-12-06 NOTE — COMMUNITY RESOURCES LIST (ENGLISH)
12/06/2023   [facility] Outpatient Clinics  N/A  For additional resource needs, please contact your health insurance member services or your primary care team.  Phone: [phone]   Email: N/A   Address: [address] [place] [address]   Hours: N/A        Food and Nutrition       Food pantry  1  [facility] Distance: 0.17 miles      Pickup   [address] [place] [address]  Language: English  Hours: Mon - Fri 12:00 PM - 1:00 PM  Fees: Free   Phone: (908) 583-6626 Email: info@camn.org Website: https://www.canUniversity Health [facility] Center.org/locations/[facility]_ca     2  [facility] Service [place] Distance: 0.35 miles      Pickup   [address] [place] [address]  Language: English  Hours: Mon - Tue 9:00 AM - 3:00 PM  Fees: Free, Self Pay   Phone: (618) 653-8507 Email: john@[facility].org Website: http://[facility].org/[facility]/ax-kysv-bzymq-ave/     SNAP application assistance  3  [employer] [place] Distance: 2 miles      Phone/Virtual   [address] [place] [address]  Language: English, Hmong, Cymraes, South Sudanese, Nicaraguan  Hours: Mon - Fri 8:30 AM - 4:30 PM  Fees: Free   Phone: (117) 150-4633 Email: helpline@hungersolutions.org Website: https://www.hungersolutions.org/programs/[place]-food-helpline/     4  [employer] En [employer] (CLUES) [facility] Distance: 0.6 miles      In-Person   [address] [place] [address]  Language: English, Nicaraguan  Hours: Mon - Fri 8:30 AM - 5:00 PM  Fees: Free   Phone: (999) 909-8376 Email: info@clues.org Website: http://www.clues.org     Soup kitchen or free meals  5  [employer] - Loaves [employer] Fishes - Loaves and Fishes Distance: 1.89 miles      Pickup   [address] [place] [address]  Language: English  Hours: Wed 5:30 PM - 6:30 PM  Fees: Free   Phone: (995) 535-8739 Email: office@orMer[facility] hospital springfield.org Website:  https://www.loavesandfishesmn.org     6  [employer] [facility] [employer] Office - Loaves and Fishes Distance: 2.91 miles      Pick   [address] [place] [address]  Language: English  Hours: Mon - Fri 5:00 PM - 6:00 PM  Fees: Free   Phone: (757) 255-3854 Email: dejah@[facility] Website: http://www.[facility].adaffix/          Hotlines and Helplines       Hotline - Housing crisis  7  [facility] Distance: 9.57 miles      Phone/Virtual   [address] [place] [address]  Language: English  Hours: Mon - Sun Open 24 Hours   Phone: (784) 669-5828 Email: communications@[facility].org Website: https://[facility]-mn.org/oursaviourshousing/     8  [facility] Distance: 11.17 miles      Phone/Virtual   [address] [place] [address]  Language: English  Hours: Mon - Sun Open 24 Hours   Phone: (561) 112-6938 Email: info@coCommentG[facility] General [facility].adaffix Website: http://www.[facility].org          Housing       Coordinated Entry access point  9  [facility] Distance: 2.35 miles      In-Person, Phone/Virtual   [address] [place] [address]  Language: English  Hours: Mon - Fri 8:30 AM - 4:30 PM  Fees: Free   Phone: (587) 929-6322 Email: info@[facility].org Website: https://www.[facility].org/locations/[facility]/     10  [facility] - Coordinated Access to Housing and Shelter ([facility]) - Coordinated Access - Coordinated Entry access point Distance: 4.37 miles      In-Person, Phone/Virtual   [address] [place] [address]  Language: English  Hours: Mon - Fri 8:00 AM - 4:30 PM  Fees: Free   Phone: (713) 194-5844 Website: https://www.[facility]./residents/assistance-support/assistance/housing-services-support     Drop-in center or day shelter  11  [facility] Distance: 0.86 miles      In-Person   [address] [place] [address]  Language: English  Hours: Mon - Fri 9:00 AM - 4:00 PM  Fees: Free   Phone: (039)  409-5925 Email: frontjanuszk@[employer] Box Website: http://[employer].Jack in the Box     12  [facility] - Opportunity [place] Distance: 9.58 miles      In-Person   [address] [place] [address]  Language: English, Swiss, Swiss  Hours: Mon - Sat 7:00 AM - 3:00 PM  Fees: Free, Self Pay   Phone: (593) 456-2464 Email: info@ViViFi Website: https://www.ViViFi/locations/opportunity-center/     Housing search assistance  13  [employer] - https://Turned [employer] Digital/ Distance: 9.7 miles      Phone/Virtual   [address] [place] [address]  Language: English  Hours: Mon - Sun Open 24 Hours   Email: info@Reflex Website: https://Turned On Digital     14  HousingLink - Online housing search assistance Distance: 10.86 miles      Phone/Virtual   [address] [address]  Language: English, Hmong, Swiss, Swiss  Hours: Mon - Sun Open 24 Hours   Phone: (306) 213-9621 Email: info@housinglink.org Website: http://www.housinglink.org/     Shelter for families  15  [facility] Distance: 13.43 miles      In-Person   [address] [place] [address]  Language: English  Hours: Mon - Fri 3:00 PM - 9:00 AM , Sat - Sun Open 24 Hours  Fees: Free   Phone: (941) 390-6830 Ext.1 Website: https://www.saintandrews.org/2020/07/03/emergency-family-shelter/     Shelter for individuals  16  [facility] [place] [facility] - [facility] [place] [facility] Distance: 2.37 miles      In-Person   [address] [place] [address]  Language: English  Hours: Mon - Sun 5:00 PM - 10:00 AM  Fees: Free, Self Pay   Phone: (253) 559-9233 Email: info@ViViFi Website: https://www.ViViFi/locations/[facility]-saint-paul/     17  [facility] - Coordinated Access to Housing and Shelter (CAHS) - Coordinated  Access - Emergency housing Distance: 4.37 miles      In-Person, Phone/Virtual   [address] [place] [address]  Language: English  Hours: Mon - Fri 8:00 AM - 4:30 PM  Fees: Free   Phone: (654) 830-4300 Website: https://www.[facility]./residents/assistance-support/assistance/housing-services-support          Transportation       Free or low-cost transportation  18  [employer]  [facility] [place] [facility] [place] [facility] Distance: 5.14 miles      In-Person   [address] [place] [address]  Language: English  Hours: Tue 9:00 AM - 2:00 PM  Fees: Self Pay   Phone: (893) 258-8805 Email: info@Avid[employer]ail Website: http://www.Rukuku.org/     19  [employer] Bus Loop - Free or low-cost transportation Distance: 5.94 miles      In-Person   [address] [place] [address]  Language: English  Hours: Mon - Fri 9:00 AM - 5:00 PM  Fees: Free   Phone: (829) 698-8560 Email: info@Axcient Website: https://www.Axcient/     Transportation to medical appointments  20  [employer] Medical Transportation - Non-Emergency Medical Transportation Distance: 2.82 miles      In-Person   [address] [place] [address]  Language: English  Hours: Mon - Fri 8:00 AM - 4:00 PM Appt. Only  Fees: Self Pay   Phone: (503) 598-1809 Website: http://www.[facility].org/Medical-Services/Emergency-medical-services/Non-emergency-transportation/     21  Disc[employer] Ride Distance: 3.43 miles      In-Person   [address] [address]  Language: English  Hours: Mon - Thu 6:00 AM - 6:00 PM , Fri 6:00 AM - 5:00 PM  Fees: Insurance, Self Pay   Phone: (191) 502-5562 Email: office@[employer] Website: https://www.[employer]/          Important Numbers & Websites       [address].org  Poison Control   (712) 350-6732 [facility].org  Suicide and Crisis Lifeline   988 988lifeline.org  Childhelp [place] Child Abuse [facility]   264.436.4812 Childhelphotline.org  National Sexual Assault  Hotline   (412) 353-7828 (HOPE) Rainn.org  National Runaway Safeline   (903) 716-5233 (RUNAWAY) AsesoriÂ­as Digitales ([employer] Advisors)Oyokey.org  Pregnancy & Postpartum Support [place]   Call/text 804-254-3285 Ppsupportmn.org  Substance Abuse National Helpline ([facility]   658-843-HELP (6324) Findtreatment.gov  Emergency Services   91

## 2023-12-06 NOTE — COMMUNITY RESOURCES LIST (ENGLISH)
12/06/2023   [facility]  N/A  For additional resource needs, please contact your health insurance member services or your primary care team.  Phone: [phone]   Email: N/A   Address: [address] [place] [address]   Hours: N/A        Food and Nutrition       Food pantry  1  [facility] Distance: 0.17 miles      Pickup   [address] [place] [address]  Language: English  Hours: Mon - Fri 12:00 PM - 1:00 PM  Fees: Free   Phone: (471) 786-7519 Email: info@camn.org Website: https://www.[facility].org/locations/[facility]_ca     2  [facility] Service [place] Distance: 0.35 miles      Pickup   [address] [place] [address]  Language: English  Hours: Mon - Tue 9:00 AM - 3:00 PM  Fees: Free, Self Pay   Phone: (451) 558-6878 Email: john@[facility].org Website: http://[facility].org/Critical access hospital/hr-emrj-davwf-ave/     SNAP application assistance  3  [employer] [place] Distance: 2 miles      Phone/Virtual   [address] [place] [address]  Language: English, Hmong, Norwegian, Malaysian, Tunisian  Hours: Mon - Fri 8:30 AM - 4:30 PM  Fees: Free   Phone: (911) 338-5713 Email: helpline@hungersolutions.org Website: https://www.hungersolutions.org/programs/[place]-food-helpline/     4  [employer]idades Latinas Unidas En Servicio (CLUES) [facility] Distance: 0.6 miles      In-Person   [address] [place] [address]  Language: English, Tunisian  Hours: Mon - Fri 8:30 AM - 5:00 PM  Fees: Free   Phone: (217) 673-2721 Email: info@clues.org Website: http://www.clues.org     Soup kitchen or free meals  5  Our [employer] witness - Loaves and Fishes - Loaves and Fishes Distance: 1.89 miles      Pickup   [address] [place] [address]  Language: English  Hours: Wed 5:30 PM - 6:30 PM  Fees: Free   Phone: (994) 444-4480 Email: office@orSaint [facility].org Website:  https://www.loavesandfishesmn.org     6  [facility] - [facility]ats[facility] Office - Loaves and Fishes Distance: 2.91 miles      Pick   [address] [place] [address]  Language: English  Hours: Mon - Fri 5:00 PM - 6:00 PM  Fees: Free   Phone: (855) 827-8818 Email: dejah@[facility] in [facility] [place].[facility] Website: http://www.Lists of hospitals in the [place].[employer]/          Hotlines and Helplines       Hotline - Housing crisis  7  [facility] Distance: 9.57 miles      Phone/Virtual   [address] [place] [address]  Language: English  Hours: Mon - Sun Open 24 Hours   Phone: (474) 183-6469 Email: communications@[facility] UnitVer[facility] Pharmamn.org Website: https://[facility]-mn.org/oursaviourshousing/     8  [facility] Distance: 11.17 miles      Phone/Virtual   [address] [place] [address]  Language: English  Hours: Mon - Sun Open 24 Hours   Phone: (248) 309-7490 Email: info@[facility].[employer] Website: http://www.[facility].org          Housing       Coordinated Entry access point  9  [facility] Distance: 2.35 miles      In-Person, Phone/Virtual   [address] [place] [address]  Language: English  Hours: Mon - Fri 8:30 AM - 4:30 PM  Fees: Free   Phone: (959) 457-2519 Email: info@[facility].org Website: https://www.[facility].org/locations/[facility]/     10  [facility] - Coordinated Access to Housing and Shelter ([facility]) - Coordinated Access - Coordinated Entry access point Distance: 4.37 miles      In-Person, Phone/Virtual   [address] [place] [address]  Language: English  Hours: Mon - Fri 8:00 AM - 4:30 PM  Fees: Free   Phone: (638) 532-3038 Website: https://www.[facility]./residents/assistance-support/assistance/housing-services-support     Drop-in center or day shelter  11  [facility] Distance: 0.86 miles      In-Person   [address] [place] [address]  Language: English  Hours: Mon - Fri 9:00 AM - 4:00 PM  Fees: Free   Phone: (599)  710-2533 Email: frontjanuszk@[employer].Ban[employer]! Website: http://CausePlay.Banter!     12  [facility] [place] - Opportunity [place] Distance: 9.58 miles      In-Person   [address] [place] [address]  Language: English, Hong Konger, Omani  Hours: Mon - Sat 7:00 AM - 3:00 PM  Fees: Free, Self Pay   Phone: (985) 190-9765 Email: info@Cels[employer] Website: https://www.Celsense/locations/opportunity-center/     Housing search assistance  13  [employer] - https://Defywire/ Distance: 9.7 miles      Phone/Virtual   [address] [place] [address]  Language: English  Hours: Mon - Sun Open 24 Hours   Email: info@[facility] Website: https://Defywire     14  HousingLink - Online housing search assistance Distance: 10.86 miles      Phone/Virtual   [address] [address]  Language: English, Hmong, Hong Konger, Omani  Hours: Mon - Sun Open 24 Hours   Phone: (682) 310-1043 Email: info@housinglink.org Website: http://www.housinglink.org/     Shelter for families  15  [facility] Distance: 13.43 miles      In-Person   [address] [place] [address]  Language: English  Hours: Mon - Fri 3:00 PM - 9:00 AM , Sat - Sun Open 24 Hours  Fees: Free   Phone: (860) 862-7088 Ext.1 Website: https://www.saintandrews.org/2020/07/03/emergency-family-shelter/     Shelter for individuals  16  [facility] [place] [facility] [place] [facility] - [facility] [place] [facility] Distance: 2.37 miles      In-Person   [address] [place] [address]  Language: English  Hours: Mon - Sun 5:00 PM - 10:00 AM  Fees: Free, Self Pay   Phone: (653) 950-3097 Email: info@C[employer] Website: https://www.Celsense/locations/[facility]-saint-paul/     17  [facility] - Coordinated Access to Housing and Shelter (CAHS) - Coordinated  Access - Emergency housing Distance: 4.37 miles      In-Person, Phone/Virtual   [address] [place] [address]  Language: English  Hours: Mon - Fri 8:00 AM - 4:30 PM  Fees: Free   Phone: (896) 734-8665 Website: https://www.[facility]./residents/assistance-support/assistance/housing-services-support          Transportation       Free or low-cost transportation  18  [employer]  The [place] [facility] [place] [facility] Bus Distance: 5.14 miles      In-Person   [address] [place] [address]  Language: English  Hours: Tue 9:00 AM - 2:00 PM  Fees: Self Pay   Phone: (660) 794-7557 Email: info@[employer] Website: http://www.ArcMail.org/     19  [employer] Bus Loop - Free or low-cost transportation Distance: 5.94 miles      In-Person   [address] [place] [address]  Language: English  Hours: Mon - Fri 9:00 AM - 5:00 PM  Fees: Free   Phone: (882) 399-4714 Email: info@Acceptd Website: https://www.Acceptd/     Transportation to medical appointments  20  [employer] Medical Transportation - Non-Emergency Medical Transportation Distance: 2.82 miles      In-Person   [address] [place] [address]  Language: English  Hours: Mon - Fri 8:00 AM - 4:00 PM Appt. Only  Fees: Self Pay   Phone: (289) 631-6158 Website: http://www.[facility].org/Medical-Services/Emergency-medical-services/Non-emergency-transportation/     21  Discover Ride Distance: 3.43 miles      In-Person   [address] [address]  Language: English  Hours: Mon - Thu 6:00 AM - 6:00 PM , Fri 6:00 AM - 5:00 PM  Fees: Insurance, Self Pay   Phone: (198) 893-3914 Email: office@[employer] Website: https://www.Neozone/          Important Numbers & Websites       [address].org  Poison Control   (747) 495-5932 [facility].org  Suicide and Crisis Lifeline   988 988lifeline.org  Childhelp [place] Child Abuse [facility] Hospit[facility]ine   109.272.4595 Childhelphotline.org  National Sexual Assault  Hotline   (343) 430-5008 (HOPE) Rainn.org  National Runaway Safeline   (488) 372-1177 (RUNAWAY) [employer].org  Pregnancy & Postpartum Support [place]   Call/text 927-520-1640 Ppsupportmn.org  Substance Abuse National Helpline ([facility]   505-801-HELP (4439) Findtreatment.gov  Emergency Services   913

## 2023-12-06 NOTE — COMMUNITY RESOURCES LIST (ENGLISH)
12/06/2023   [facility]  N/A  For questions about this resource list or additional care needs, please contact your primary care clinic or care manager.  Phone: [phone]   Email: N/A   Address: [address] [place] [address]   Hours: N/A        Food and Nutrition       Food pantry  1  [facility] Distance: 0.17 miles      Pickup   [address] [place] [address]  Language: English  Hours: Mon - Fri 12:00 PM - 1:00 PM  Fees: Free   Phone: (641) 451-2042 Email: info@[facility].org Website: https://www.[facility].org/locations/_[place]_ca     2  [facility] [place] Distance: 0.35 miles      Pickup   [address] [place] [address]  Language: English  Hours: Mon - Tue 9:00 AM - 3:00 PM  Fees: Free, Self Pay   Phone: (978) 353-4103 Email: john@[facility].org Website: http://[facility].org/[facility]/qi-nkbw-yavtz-ave/     SNAP application assistance  3  [employer]) [facility] Distance: 0.6 miles      In-Person   [address] [place] [address]  Language: English, Tristanian  Hours: Mon - Fri 8:30 AM - 5:00 PM  Fees: Free   Phone: (387) 884-7515 Email: info@clues.org Website: http://www.clues.org     4  [place] Department of Human Services - [place] (SNAP) Distance: 1.62 miles      Phone/Virtual   [address] [address] [place] [address]  Language: English, Hmong, Iranian, Guyanese, Tristanian, Lithuanian  Hours: Mon - Fri 9:00 AM - 5:00 PM  Fees: Free   Phone: (443) 990-6195 Website: https://[place].gov/dhs/people-we-serve/adults/economic-assistance/food-nutrition/programs-and-services/supplemental-nutrition-assistance-program.jsp     Soup kitchen or free meals  5  [facility] - [employer] - Loaves and Fishes Distance: 1.89 miles      Pickup   [address] [place] [address]  Language: English  Hours: Wed 5:30 PM - 6:30 PM   Fees: Free   Phone: (724) 331-7485 Email: office@[facility].org Website: https://www.[facility].org     6  [facility] - [place] [employer] [place] Distance: 2.91 miles      Pick   [address] [place] [address]  Language: English  Hours: Mon - Fri 5:00 PM - 6:00 PM  Fees: Free   Phone: (808) 442-6560 Email: pgrant@[facility] Hospital[facility].fr Website: http://www.[facility] Hospital[facility]y.fr/          Hotlines and Helplines       Hotline - Housing crisis  7  [facility] Distance: 9.57 miles      Phone/Virtual   [address] [place] [address]  Language: English  Hours: Mon - Sun Open 24 Hours   Phone: (276) 162-3209 Email: communications@[facility].org Website: https://[facility].org/oursaviourshousing/     8  [facility] Distance: 11.17 miles      Phone/Virtual   [address] [place] [address]  Language: English  Hours: Mon - Sun Open 24 Hours   Phone: (516) 142-3859 Email: info@[facility].org Website: http://www.[facility].org          Housing       Coordinated Entry access point  9  [facility] Distance: 2.35 miles      In-Person, Phone/Virtual   [address] [place] [address]  Language: English  Hours: Mon - Fri 8:30 AM - 4:30 PM  Fees: Free   Phone: (482) 326-3633 Email: info@[facility].org Website: https://www.[facility].org/locations/[facility]/     10  [facility] [place] [facility] - Coordinated Access to Housing and Shelter (CAHS) - Coordinated Access - Coordinated Entry access point Distance: 4.37 miles      In-Person, Phone/Virtual   [address] [place] [address]  Language: English  Hours: Mon - Fri 8:00 AM - 4:30 PM  Fees: Free   Phone: (452) 384-6071 Website: https://www.Psychiatric./residents/assistance-support/assistance/housing-services-support     Drop-in center or day shelter  11  [facility]  [place] Distance: 0.86 miles      In-Person   [address] [place] [address]  Language: English   Hours: Mon - Fri 9:00 AM - 4:00 PM  Fees: Free   Phone: (820) 741-2598 Email: caitlynk@[employer] Movement Website: http://[employer] [place].Fashion Movement     12  [facility] [place] [facility] Distance: 9.58 miles      In-Person   [address] [place] [address]  Language: English, Syrian, Upper sorbian  Hours: Mon - Sat 7:00 AM - 3:00 PM  Fees: Free, Self Pay   Phone: (519) 821-3209 Email: info@[employer] Website: https://www.[employer].F[employer] Movement/locations/opportunity-center/     Housing search assistance  13  [facility] - Housing Search Assistance Distance: 2.78 miles      Phone/Virtual   [address] [place] [address]  Language: Frisian, English, Hmong, Martha, Syrian, Upper sorbian  Hours: Mon - Fri Appt. Only  Fees: Free   Phone: (822) 279-5010 Website: https://[facility].org/     14  [facility] - Online Housing Search Assistance Distance: 5.58 miles      Phone/Virtual   [address] [place] [address]  Language: English  Hours: Mon - Sun Open 24 Hours  Fees: Free   Phone: (791) 190-1598 Email: gagan@[employer] Movement Website: https://[employer].Fashion Movement/     Shelter for families  15  [facility] Distance: 13.43 miles      In-Person   [address] [place] [address]  Language: English  Hours: Mon - Fri 3:00 PM - 9:00 AM , Sat - Sun Open 24 Hours  Fees: Free   Phone: (580) 135-4890 Ext.1 Website: https://www.[facility].org/2020/07/03/emergency-family-shelter/     Shelter for individuals  16  [facility] [place] - [facility] [place] [facility] - [facility] [place] [facility] Distance: 2.37 miles      In-Person   [address] [place] [address]  Language: English  Hours: Mon - Sun 5:00 PM - 10:00 AM  Fees: Free, Self Pay   Phone: (766) 540-4621 Email: info@cctwincities.org Website:  https://www.[facility].org/locations/higher-[facility]-[place]/     17  [facility] - Coordinated Access to Housing and Shelter (CAHS) - Coordinated Access - Emergency housing Distance: 4.37 miles      In-Person, Phone/Virtual   [address] [place] [address]  Language: English  Hours: Mon - Fri 8:00 AM - 4:30 PM  Fees: Free   Phone: (878) 404-1506 Website: https://www.[facility]./residents/assistance-support/assistance/housing-services-support          Transportation       Free or low-cost transportation  18  [employer] [place] [employer] Bus Distance: 5.14 miles      In-Person   [address] [place] [address]  Language: English  Hours: Tue 9:00 AM - 2:00 PM  Fees: Self Pay   Phone: (260) 837-8245 Email: info@[employer] Website: http://www."[employer].org/     19  [employer] Bus Loop - Free or low-cost transportation Distance: 5.94 miles      In-Person   [address] [place] [address]  Language: English  Hours: Mon - Fri 9:00 AM - 5:00 PM  Fees: Free   Phone: (178) 616-3112 Email: info@[employer] Website: https://www.[employer]/     Transportation to medical appointments  20  [facility] - Non-Emergency Medical Transportation Distance: 2.82 miles      In-Person   [address] [place] [address]  Language: English  Hours: Mon - Fri 8:00 AM - 4:00 PM Appt. Only  Fees: Self Pay   Phone: (875) 289-4132 Website: http://www.[facility].org/Medical-Services/Emergency-medical-services/Non-emergency-transportation/     21  [employer] Distance: 3.43 miles      In-Person   [address] [address]  Language: English  Hours: Mon - Thu 6:00 AM - 6:00 PM , Fri 6:00 AM - 5:00 PM  Fees: Insurance, Self Pay   Phone: (768) 544-9709 Email: office@[employer] Website: https://www.[employer]/          Important Numbers & Websites       Emergency Services   911  City Services   311  Poison  Control   (330) 645-8327  Suicide Prevention Lifeline   (879) 951-5411 (TALK)  Child Abuse Hotline   (150) 611-2922 (4-A-Child)  Sexual Assault Hotline   (855) 917-1962 (HOPE)  National Runaway Safeline   (666) 308-6066 (RUNAWAY)  All-Options Talkline   (778) 318-9615  Substance Abuse Referral   (899) 601-2254 (HELP)

## 2023-12-06 NOTE — PROGRESS NOTES
Preceptor Attestation:    I discussed the patient with the resident and evaluated the patient in person. I have verified the content of the note, which accurately reflects my assessment of the patient and the plan of care.   Supervising Physician:  Jonathan Solis DO.

## 2023-12-06 NOTE — PATIENT INSTRUCTIONS
Your doctor has put in a mental health referral for psychotherapy and/or psychiatry treatment. Our behavioral health team will evaluate your referral to see if they have any more recommendations or if they have openings for therapy in-house at [facility]. Our referral team would then contact you within 1-2 weeks with this update. However, if you want to get started for psychotherapy or schedule with a psychiatrist sooner, we recommend you call one or two of the following resources.  Let your doctor know if you do connect with a therapist or psychiatrist.     [facility] Scheduling  [phone].     Community Mental Health Agencies:    [facility] Office   [address]  [place] [address]  [phone] (for appointments)    [facility]  [address] [address]  Phone:  [phone]    [address]  [place] [address]  [phone]    [facility]  [address] [place] [address]  [phone]    [facility]  [address]  [address]  [place], [place] [address]  [phone]    [facility]  [address] [facility]  [place] [address]  [phone]    Community Psychiatry Agencies:    [facility]  [address]  [address]  [place], [place] [address]  [phone]    [facility] Office  [address]  [place] [address]  [phone] (for appointments)    [facility]  [address] [facility] [address]  [place] [address]  Phone:  [phone]    [address]  [address]  [place], [place] [address] [phone]    [facility]  [address] [facility]  [address]  [place] [address]  [phone]    [facility]  [address]  [place] [address]  [phone]    [facility] - offers psychiatry and psychology services across the lifespan  [facility]  [address] [place] [address]  [address]  [place], [place]  [phone]      Preventive Health Recommendations  Female Ages 26 - 39  Yearly exam:   See your health care provider every year in order to  Review health changes.   Discuss preventive care.    Review your medicines if you your doctor has prescribed any.    Until age 30: Get a Pap test every three years (more often if you have had an abnormal result).    After age 30: Talk to your doctor about whether you should have a Pap test every 3 years or have a Pap test with HPV screening every 5 years.   You do not need a Pap test if your uterus was removed (hysterectomy) and you have not had cancer.  You should be tested each year for STDs (sexually transmitted diseases), if you're at risk.   Talk to your provider about how often to have your cholesterol checked.  If you are at risk for diabetes, you should have a diabetes test (fasting glucose).  Shots: Get a flu shot each year. Get a tetanus shot every 10 years.   Nutrition:   Eat at least 5 servings of fruits and vegetables each day.  Eat whole-grain bread, whole-wheat pasta and brown rice instead of white grains and rice.  Get adequate Calcium and Vitamin D.     Lifestyle  Exercise at least 150 minutes a week (30 minutes a day, 5 days of the week). This will help you control your weight and prevent disease.  Limit alcohol to one drink per day.  No smoking.   Wear sunscreen to prevent skin cancer.  See your dentist every six months for an exam and cleaning.

## 2023-12-07 ENCOUNTER — DOCUMENTATION ONLY (OUTPATIENT)
Dept: FAMILY MEDICINE | Facility: CLINIC | Age: 33
End: 2023-12-07
Payer: COMMERCIAL

## 2023-12-07 LAB
ANION GAP SERPL CALCULATED.3IONS-SCNC: 10 MMOL/L (ref 7–15)
BUN SERPL-MCNC: 6.8 MG/DL (ref 6–20)
CALCIUM SERPL-MCNC: 9.1 MG/DL (ref 8.6–10)
CHLORIDE SERPL-SCNC: 105 MMOL/L (ref 98–107)
CHOLEST SERPL-MCNC: 138 MG/DL
CREAT SERPL-MCNC: 0.46 MG/DL (ref 0.51–0.95)
DEPRECATED HCO3 PLAS-SCNC: 22 MMOL/L (ref 22–29)
EGFRCR SERPLBLD CKD-EPI 2021: >90 ML/MIN/1.73M2
FASTING STATUS PATIENT QL REPORTED: ABNORMAL
GLUCOSE SERPL-MCNC: 90 MG/DL (ref 70–99)
HDLC SERPL-MCNC: 41 MG/DL
LDLC SERPL CALC-MCNC: 85 MG/DL
NONHDLC SERPL-MCNC: 97 MG/DL
POTASSIUM SERPL-SCNC: 3.9 MMOL/L (ref 3.4–5.3)
SODIUM SERPL-SCNC: 137 MMOL/L (ref 135–145)
TRIGL SERPL-MCNC: 62 MG/DL

## 2023-12-07 NOTE — PROGRESS NOTES
To be completed in Nursing note:    Please reference list for forms that require a visit for completion.  Please remind patients that providers are given 3-5 business days to complete and return forms.      Form type: DME: Equipment Repair    Date form received: 12/4/23    Date form completed by Physician:12/6/23    How was form returned to patient (mailed, faxed, or at  for patient to ): Fax to [employer] at [phone]    Date form mailed/faxed/left at  for patient and sent to HIM for scanning: Faxed 12/6/23 at 8:11am      Once form is left for patient, faxed, or mailed PCS will then close the documentation only encounter.

## 2023-12-11 ENCOUNTER — MEDICAL CORRESPONDENCE (OUTPATIENT)
Dept: HEALTH INFORMATION MANAGEMENT | Facility: CLINIC | Age: 33
End: 2023-12-11
Payer: COMMERCIAL

## 2023-12-26 ENCOUNTER — MEDICAL CORRESPONDENCE (OUTPATIENT)
Dept: HEALTH INFORMATION MANAGEMENT | Facility: CLINIC | Age: 33
End: 2023-12-26
Payer: COMMERCIAL

## 2024-01-12 ENCOUNTER — PRE VISIT (OUTPATIENT)
Dept: UROLOGY | Facility: CLINIC | Age: 34
End: 2024-01-12
Payer: COMMERCIAL

## 2024-01-12 ENCOUNTER — TELEPHONE (OUTPATIENT)
Dept: UROLOGY | Facility: CLINIC | Age: 34
End: 2024-01-12
Payer: COMMERCIAL

## 2024-01-12 DIAGNOSIS — N31.9 NEUROGENIC BLADDER: Primary | ICD-10-CM

## 2024-01-12 NOTE — TELEPHONE ENCOUNTER
Left Voicemail (1st Attempt) for the patient to call back and schedule the following:    Appointment type: new neurogenic bladder, reschedule from Dr. Novak to lalo griffith NP  Provider: Lalo Griffith NP  Specialty phone number: [phone]  Additional appointment(s) needed: Renal US

## 2024-01-22 ENCOUNTER — PRE VISIT (OUTPATIENT)
Dept: UROLOGY | Facility: CLINIC | Age: 34
End: 2024-01-22

## 2024-01-29 ENCOUNTER — TELEPHONE (OUTPATIENT)
Dept: PHYSICAL THERAPY | Facility: CLINIC | Age: 34
End: 2024-01-29
Payer: COMMERCIAL

## 2024-02-05 ENCOUNTER — TELEPHONE (OUTPATIENT)
Dept: PHYSICAL THERAPY | Facility: CLINIC | Age: 34
End: 2024-02-05
Payer: COMMERCIAL

## 2024-02-06 ENCOUNTER — THERAPY VISIT (OUTPATIENT)
Dept: PHYSICAL THERAPY | Facility: CLINIC | Age: 34
End: 2024-02-06
Attending: FAMILY MEDICINE
Payer: COMMERCIAL

## 2024-02-06 DIAGNOSIS — G82.20 LOWER PARAPLEGIA (H): Primary | ICD-10-CM

## 2024-02-06 PROCEDURE — 97162 PT EVAL MOD COMPLEX 30 MIN: CPT | Mod: GP

## 2024-02-06 PROCEDURE — 97110 THERAPEUTIC EXERCISES: CPT | Mod: GP
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
## 2024-02-13 ENCOUNTER — TELEPHONE (OUTPATIENT)
Dept: PHYSICAL THERAPY | Facility: CLINIC | Age: 34
End: 2024-02-13
Payer: COMMERCIAL

## 2024-03-13 ENCOUNTER — TELEPHONE (OUTPATIENT)
Dept: PHYSICAL THERAPY | Facility: CLINIC | Age: 34
End: 2024-03-13
Payer: COMMERCIAL

## 2024-03-14 NOTE — PROGRESS NOTES
DISCHARGE  Reason for Discharge: Patient has no showed multiple visits     Equipment Issued: None    Discharge Plan: Patient to continue home program.    Referring Provider:  Argentina Sanford    02/06/24 0500   Appointment Info   Signing clinician's name / credentials Noemi Villalba, PT, DPT   Visits Used 1/10   Medical Diagnosis Lower paraplegia (H) (G82.20)   PT Tx Diagnosis Force production deficit, sensory detection deficit   Quick Adds Certification   Progress Note/Certification   Start of Care Date 02/06/24   Onset of illness/injury or Date of Surgery 12/06/23   Therapy Frequency 1x/week   Predicted Duration 12 weeks   Certification date from 02/06/24   Certification date to 04/23/24   Progress Note Due Date 04/23/24   Progress Note Completed Date 02/06/24   GOALS   PT Goals 2;3;4   PT Goal 1   Goal Identifier HEP   Goal Description Pt will be able to demonstrate HEP activities without need for cues from provider to demonstrate understanding and independence with HEP.   Rationale to maximize safety and independence with performance of ADLs and functional tasks   Target Date 04/23/24   PT Goal 2   Goal Identifier Floor to WC transfer   Goal Description Carol will demonstrate independence with transition from the floor to her power wheelchair independently on 3/3 attempts for improved safety with getting up from the floor to allow independence with playing with [employer] on the floor.   Rationale to maximize safety and independence with performance of ADLs and functional tasks   Target Date 04/23/24   PT Goal 3   Goal Identifier Static stance   Goal Description Carol will be able to maintain static stance with LRAD for at least 15 seconds for improved balance, progressing towards her goal of taking steps.   Rationale to maximize safety and independence with performance of ADLs and functional tasks   Target Date 04/23/24   PT Goal 4   Goal Identifier Sitting balance   Goal Description Carol will  demonstrate a 5 point improvement on the Function in Sitting Test to demonstrate improved proximal strength for improved activity tolerance with sitting with her kids/grandchild on the floor.   Rationale to maximize safety and independence with performance of ADLs and functional tasks   Target Date 04/23/24   Subjective Report   Subjective Report see eval   Treatment Interventions (PT)   Interventions Therapeutic Procedure/Exercise   Therapeutic Procedure/Exercise   Therapeutic Procedures: strength, endurance, ROM, flexibility minutes (65704) 13   Ther Proc 1 Review of HEP   Ther Proc 1 - Details Pt educated on the following exercise to perform at home. Demonstrated/verbalized understanding and given print out to take home.   Skilled Intervention Edu on HEP, demonstration   PTRx Ther Proc 1 Towel Stretch Gastroc   PTRx Ther Proc 2 Strap Assisted Straight Leg Stretch   Eval/Assessments   PT Eval, Moderate Complexity Minutes (64452) 27   Education   Learner/Method Patient;Demonstration;Pictures/Video;Listening   Plan   Home program PTRx   Plan for next session Perform FIST, Does she still have KAFOs, trial static stance with 2WW and liftt, FES bike   Total Session Time   Timed Code Treatment Minutes 13   Total Treatment Time (sum of timed and untimed services) 40

## 2024-03-27 ENCOUNTER — MYC REFILL (OUTPATIENT)
Dept: FAMILY MEDICINE | Facility: CLINIC | Age: 34
End: 2024-03-27
Payer: COMMERCIAL

## 2024-03-27 DIAGNOSIS — G82.20 LOWER PARAPLEGIA (H): ICD-10-CM

## 2024-03-27 DIAGNOSIS — G82.20 LOWER PARAPLEGIA (H): Primary | ICD-10-CM

## 2024-03-27 DIAGNOSIS — J30.1 ALLERGIC RHINITIS DUE TO POLLEN, UNSPECIFIED SEASONALITY: ICD-10-CM

## 2024-03-27 DIAGNOSIS — R11.0 NAUSEA: ICD-10-CM

## 2024-03-27 DIAGNOSIS — I10 ESSENTIAL HYPERTENSION: ICD-10-CM

## 2024-03-27 RX ORDER — LORATADINE 10 MG/1
10 TABLET ORAL DAILY PRN
Qty: 30 TABLET | Refills: 4 | Status: SHIPPED | OUTPATIENT
Start: 2024-03-27

## 2024-03-27 RX ORDER — ACETAMINOPHEN 325 MG/1
325 TABLET ORAL EVERY 4 HOURS PRN
Qty: 100 TABLET | Refills: 0 | Status: SHIPPED | OUTPATIENT
Start: 2024-03-27

## 2024-03-27 RX ORDER — FLUTICASONE PROPIONATE 50 MCG
2 SPRAY, SUSPENSION (ML) NASAL 2 TIMES DAILY PRN
Qty: 16 G | Refills: 3 | Status: SHIPPED | OUTPATIENT
Start: 2024-03-27

## 2024-03-27 RX ORDER — ONDANSETRON 4 MG/1
4 TABLET, ORALLY DISINTEGRATING ORAL EVERY 8 HOURS PRN
Qty: 60 TABLET | Refills: 0 | Status: SHIPPED | OUTPATIENT
Start: 2024-03-27

## 2024-03-27 RX ORDER — AMLODIPINE BESYLATE 5 MG/1
5 TABLET ORAL DAILY
Qty: 90 TABLET | Refills: 3 | Status: SHIPPED | OUTPATIENT
Start: 2024-03-27

## 2024-03-27 RX ORDER — CYCLOBENZAPRINE HCL 10 MG
10 TABLET ORAL 3 TIMES DAILY PRN
Qty: 120 TABLET | Refills: 0 | Status: SHIPPED | OUTPATIENT
Start: 2024-03-27

## 2024-03-27 NOTE — TELEPHONE ENCOUNTER
Medication requested: loratadine (CLARITIN) 10 MG tablet   Last office visit: 12/6/23  Future [facility] appointments: none  Medication last refilled: 12/5/24  Last qualifying labs: none    Prescription approved per [facility] Refill Protocol.  Antihistamines Protocol Baqdhy3603/27/2024 10:49 AM   Protocol Details Patient is 3-64 years of age    Recent (12 mo) or future (30 days) visit within the authorizing provider's specialty    Patient is age 3 or older    Medication is active on med list     Medication requested: diphenhydrAMINE (BENADRYL) 25 MG tablet   Medication last refilled: 12/5/24  Last qualifying labs: none    Prescription approved per [facility] Refill Protocol.  Antihistamines Protocol Glbkrq0203/27/2024 10:49 AM   Protocol Details Patient is 3-64 years of age    Recent (12 mo) or future (30 days) visit within the authorizing provider's specialty    Patient is age 3 or older    Medication is active on med list     Medication requested: ondansetron (ZOFRAN ODT) 4 MG ODT tab   Medication last refilled: 12/5/24  Last qualifying labs: none    Prescription approved per [facility] Refill Protocol.   Antivertigo/Antiemetic Agents Qrhdbt0203/27/2024 10:49 AM   Protocol Details Medication is active on med list    Medication indicated for associated diagnosis    Recent (12 mo) or future (90 days) visit with authorizing provider's specialty    Patient is 18 years of age or older       cyclobenzaprine (FLEXERIL) 10 MG tablet          Sig: Take 1 tablet (10 mg) by mouth 3 times daily as needed for muscle spasms    Disp: 120 tablet    Refills: 0    Start: 3/27/2024    Class: E-Prescribe    Non-formulary For: Lower paraplegia (H)    Last ordered: 3 months ago (12/5/2023) by Argentina Sanford MD     Routing refill request to provider for review/approval because:  No attached protocol for RN refill      fluticasone (FLONASE) 50 MCG/ACT nasal spray          Sig: Spray 2 sprays into both nostrils 2 times daily as needed for rhinitis  or allergies    Disp: 16 g    Refills: 3    Start: 3/27/2024    Class: E-Prescribe    Non-formulary For: Allergic rhinitis due to pollen, unspecified seasonality    Last ordered: 3 months ago (12/5/2023) by Argentina Sanford MD    Nasal Allergy Protocol [id number]/27/2024 10:49 AM   Protocol Details Patient is age 12 or older    Recent (12 mo) or future (30 days) visit within the authorizing provider's specialty    Medication is active on med list        Routing refill request to provider for review/approval because:  Allergy warning      amLODIPine (NORVASC) 5 MG tablet          Sig: Take 1 tablet (5 mg) by mouth daily    Disp: 90 tablet    Refills: 3    Start: 3/27/2024    Class: E-Prescribe    Non-formulary For: Essential hypertension    Last ordered: 3 months ago (12/5/2023) by Argentina Sanford MD     Routing refill request to provider for review/approval because:    Calcium Channel Blockers Protocol  Fmmpnt8203/27/2024 10:49 AM   Protocol Details Blood pressure under 140/90 in past 12 months    Normal serum creatinine on file in past 12 months        Silvano, RN, BSN

## 2024-04-22 ENCOUNTER — DOCUMENTATION ONLY (OUTPATIENT)
Dept: FAMILY MEDICINE | Facility: CLINIC | Age: 34
End: 2024-04-22
Payer: COMMERCIAL

## 2024-04-22 NOTE — PROGRESS NOTES
To be completed in Nursing note:    Please reference list for forms that require a visit for completion.  Please remind patients that providers are given 3-5 business days to complete and return forms.      Form type: Standard Written Order: [employer] CTF    Date form received: 4/22/24    Date form completed by Physician:     How was form returned to patient (mailed, faxed, or at  for patient to ): Fax to [place] at [phone]    Date form mailed/faxed/left at  for patient and sent to HIM for scanning: Faxed 5/2/24 at 8:31am      Once form is left for patient, faxed, or mailed PCS will then close the documentation only encounter.

## 2024-05-02 ENCOUNTER — TRANSFERRED RECORDS (OUTPATIENT)
Dept: HEALTH INFORMATION MANAGEMENT | Facility: CLINIC | Age: 34
End: 2024-05-02

## 2024-05-02 ENCOUNTER — MEDICAL CORRESPONDENCE (OUTPATIENT)
Dept: HEALTH INFORMATION MANAGEMENT | Facility: CLINIC | Age: 34
End: 2024-05-02
Payer: COMMERCIAL

## 2024-06-06 ENCOUNTER — MEDICAL CORRESPONDENCE (OUTPATIENT)
Dept: HEALTH INFORMATION MANAGEMENT | Facility: CLINIC | Age: 34
End: 2024-06-06
Payer: COMMERCIAL

## 2024-08-21 ENCOUNTER — MEDICAL CORRESPONDENCE (OUTPATIENT)
Dept: HEALTH INFORMATION MANAGEMENT | Facility: CLINIC | Age: 34
End: 2024-08-21
Payer: COMMERCIAL

## 2024-08-22 ENCOUNTER — DOCUMENTATION ONLY (OUTPATIENT)
Dept: FAMILY MEDICINE | Facility: CLINIC | Age: 34
End: 2024-08-22
Payer: COMMERCIAL

## 2024-08-22 NOTE — PROGRESS NOTES
To be completed in Nursing note:    Please reference list for forms that require a visit for completion.  Please remind patients that providers are given 3-5 business days to complete and return forms.      Form type: [employer]  ~ STANDARD WRITTEN ORDER: OSTOMY    Date form received: 8/20/2024    Date form completed by Physician: 8/21/2024    How was form returned to patient (mailed, faxed, or at  for patient to ):    Fax to [phone]    Date form mailed/faxed/left at  for patient and sent to HIM for scanning:    Fax on 8/22/2024    Once form is left for patient, faxed, or mailed PCS will then close the documentation only encounter.

## 2024-10-01 NOTE — TELEPHONE ENCOUNTER
02/01/2022: Care Coordination    Request for transportation to an upcoming appointment on Feb 3 2022 at 2:50pm. [employer] will pick her up at 2:00pm. She will need to use the will call service for her return trip.         Daniel Coyne  Care Coordination    
[Arrhythmia/ECG Abnorrmalities] : arrhythmia/ECG abnormalities

## 2024-11-06 ENCOUNTER — PATIENT OUTREACH (OUTPATIENT)
Dept: CARE COORDINATION | Facility: CLINIC | Age: 34
End: 2024-11-06
Payer: COMMERCIAL

## 2024-11-20 ENCOUNTER — PATIENT OUTREACH (OUTPATIENT)
Dept: CARE COORDINATION | Facility: CLINIC | Age: 34
End: 2024-11-20
Payer: COMMERCIAL

## 2025-01-18 ENCOUNTER — HEALTH MAINTENANCE LETTER (OUTPATIENT)
Age: 35
End: 2025-01-18

## 2025-07-10 ENCOUNTER — MEDICAL CORRESPONDENCE (OUTPATIENT)
Dept: HEALTH INFORMATION MANAGEMENT | Facility: CLINIC | Age: 35
End: 2025-07-10
Payer: COMMERCIAL

## 2025-07-10 ENCOUNTER — DOCUMENTATION ONLY (OUTPATIENT)
Dept: FAMILY MEDICINE | Facility: CLINIC | Age: 35
End: 2025-07-10
Payer: COMMERCIAL

## 2025-07-10 NOTE — PROGRESS NOTES
To be completed in Nursing note:    Please reference list for forms that require a visit for completion.  Please remind patients that providers are given 3-5 business days to complete and return forms.      Form type: [employer] ~ STANDARD WRITTEN ORDER    Date form received: 7/7/2025    Date form completed by Physician: 7/10/2025    How was form returned to patient (mailed, faxed, or at  for patient to ):    Fax to [phone]    Date form mailed/faxed/left at  for patient and sent to HIM for scanning:    Fax on 7/10/2025    Once form is left for patient, faxed, or mailed PCS will then close the documentation only encounter.